# Patient Record
Sex: FEMALE | Race: BLACK OR AFRICAN AMERICAN | Employment: OTHER | URBAN - METROPOLITAN AREA
[De-identification: names, ages, dates, MRNs, and addresses within clinical notes are randomized per-mention and may not be internally consistent; named-entity substitution may affect disease eponyms.]

---

## 2017-02-20 ENCOUNTER — TELEPHONE (OUTPATIENT)
Dept: FAMILY MEDICINE CLINIC | Age: 62
End: 2017-02-20

## 2017-02-20 NOTE — TELEPHONE ENCOUNTER
Patient just called stating she should not have to make any appointment to talk to her doctor.       (Documentation made)
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
## 2017-02-20 NOTE — TELEPHONE ENCOUNTER
Patient calling to office and asking that Dr. Paige Casanova please call her. Patient states she doesn't understand why she can't just call to office and have someone available to speak with her. Patient notes she called to office earlier and was placed on hold to speak with Rhianna MILLER( Practice Admin)and after holding was told that Tomas Briones didn't want to speak with her. Patient states we should have our calls recorded and ask that tape be pulled. Patient also notes that she received a call from nurse at office today and claims nurse was rude to her, after the nurse simply told her to schedule appointment to come in to discuss her issues. Explained to patient that she can't expect to call into office and have someone always available to speak to her during clinic with exception of urgent calls. Patient stated she understood that and that she's not dumb. I told patient I didn't say you were dumb and would never say such. Patient kept repeatedly saying how she didn't understand why she cant just call in and speak to someone and or they call her back. Patient was very negative about the practice and states the customer service here was awful. Patient ask for a number to call to express her concerns. I provided her with the number to the patient advocacy [phone]. Patient also stated she didn't want to have to come to office and act like a ( \" ni **a \"). Patient asked for my name at end of call and for the spelling.   Sharon (PSR)

## 2017-02-20 NOTE — TELEPHONE ENCOUNTER
Spoke to patient who states that she has something with her breast that she is worried about it. I asked her if it had heat it she stated no. There is no drainage per the patient. I urged her to have it evaluated and I scheduled an appointment with Dr. Sharron Mccurdy. Ms. Aaron Garza expectations vs the workflow here was discussed. She was also reminded again of making appointments should she need to speak to the physician.

## 2017-02-27 ENCOUNTER — OFFICE VISIT (OUTPATIENT)
Dept: FAMILY MEDICINE CLINIC | Age: 62
End: 2017-02-27

## 2017-02-27 VITALS
BODY MASS INDEX: 23.55 KG/M2 | HEART RATE: 84 BPM | SYSTOLIC BLOOD PRESSURE: 164 MMHG | OXYGEN SATURATION: 94 % | TEMPERATURE: 97.8 F | WEIGHT: 159 LBS | RESPIRATION RATE: 18 BRPM | HEIGHT: 69 IN | DIASTOLIC BLOOD PRESSURE: 99 MMHG

## 2017-02-27 DIAGNOSIS — R17 ELEVATED BILIRUBIN: ICD-10-CM

## 2017-02-27 DIAGNOSIS — R73.03 PREDIABETES: ICD-10-CM

## 2017-02-27 DIAGNOSIS — I10 ESSENTIAL HYPERTENSION: ICD-10-CM

## 2017-02-27 DIAGNOSIS — R17 SCLERAL ICTERUS: ICD-10-CM

## 2017-02-27 DIAGNOSIS — Z91.14 NON COMPLIANCE W MEDICATION REGIMEN: ICD-10-CM

## 2017-02-27 DIAGNOSIS — N64.4 MASTODYNIA OF LEFT BREAST: Primary | ICD-10-CM

## 2017-02-27 NOTE — PROGRESS NOTES
Chief Complaint   Patient presents with    Breast pain     left breast heavy feeling. .. right leg swell and feels heavy as well. . wants some labwork. ..      1. Have you been to the ER, urgent care clinic since your last visit? Hospitalized since your last visit? No    2. Have you seen or consulted any other health care providers outside of the [address] [place] [address] since your last visit? Include any pap smears or colon screening.  No

## 2017-02-27 NOTE — PATIENT INSTRUCTIONS
Breast Pain: Care Instructions  Your Care Instructions  Breast tenderness and pain may come and go with your monthly periods (cyclic), or it may not follow any pattern (noncyclic). Breast pain is rarely caused by a serious health problem. You may need tests to find the cause. Follow-up care is a key part of your treatment and safety. Be sure to make and go to all appointments, and call your doctor if you are having problems. Its also a good idea to know your test results and keep a list of the medicines you take. How can you care for yourself at home? · If your doctor gave you medicine, take it exactly as prescribed. Call your doctor if you think you are having a problem with your medicine. · Take an over-the-counter pain medicine, such as acetaminophen (Tylenol), ibuprofen (Advil, Motrin), or naproxen (Aleve), to relieve pain and swelling. Read and follow all instructions on the label. · Do not take two or more pain medicines at the same time unless the doctor told you to. Many pain medicines have acetaminophen, which is Tylenol. Too much acetaminophen (Tylenol) can be harmful. · Wear a supportive bra, such as a sports bra or a jog bra. · Cut down on the amount of fat in your diet. If you need help planning healthy meals, see a dietitian. · Get at least 30 minutes of exercise on most days of the week. Walking is a good choice. You also may want to do other activities, such as running, swimming, cycling, or playing tennis or team sports. · Keep a healthy sleep pattern. Go to bed at the same time every night, and get up at the same time every day. When should you call for help? Call your doctor now or seek immediate medical care if:  · You have new changes in a breast, such as:  ¨ A lump or thickening in your breast or armpit. ¨ A change in the breast's size or shape. ¨ Skin changes, such as dimples or puckers. ¨ Nipple discharge.   ¨ A change in the color or feel of the skin of your breast or the darker area around the nipple (areola). ¨ A change in the shape of the nipple (it may look like it's being pulled into the breast). · You have symptoms of a breast infection, such as:  ¨ Increased pain, swelling, redness, or warmth around a breast.  ¨ Red streaks extending from the breast.  ¨ Pus draining from a breast.  ¨ A fever. Watch closely for changes in your health, and be sure to contact your doctor if:  · Your breast pain does not get better after 1 week. · You have a lump or thickening in your breast or armpit. Where can you learn more? Go to http://karen-alfredito.info/. Enter O515 in the search box to learn more about \"Breast Pain: Care Instructions. \"  Current as of: May 27, 2016  Content Version: 11.1  © 0600-6612 [employer]. Care instructions adapted under license by [employer] (which disclaims liability or warranty for this information). If you have questions about a medical condition or this instruction, always ask your healthcare professional. [place] [address] any warranty or liability for your use of this information. Prediabetes: Care Instructions  Your Care Instructions  Prediabetes is a warning sign that you are at risk for getting type 2 diabetes. It means that your blood sugar is higher than it should be. The food you eat turns into sugar, which your body uses for energy. Normally, an organ called the pancreas makes insulin, which allows the sugar in your blood to get into your body's cells. But when your body can't use insulin the right way, the sugar doesn't move into cells. It stays in your blood instead. This is called insulin resistance. The buildup of sugar in the blood causes prediabetes. The good news is that lifestyle changes may help you get your blood sugar back to normal and help you avoid or delay diabetes. Follow-up care is a key part of your treatment and safety.  Be sure to make and go to all appointments, and call your doctor if you are having problems. It's also a good idea to know your test results and keep a list of the medicines you take. How can you care for yourself at home? · Watch your weight. A healthy weight helps your body use insulin properly. · Limit the amount of calories, sweets, and unhealthy fat you eat. Ask your doctor if you should see a dietitian. A registered dietitian can help you create meal plans that fit your lifestyle. · Get at least 30 minutes of exercise on most days of the week. Exercise helps control your blood sugar. It also helps you maintain a healthy weight. Walking is a good choice. You also may want to do other activities, such as running, swimming, cycling, or playing tennis or team sports. · Do not smoke. Smoking can make prediabetes worse. If you need help quitting, talk to your doctor about stop-smoking programs and medicines. These can increase your chances of quitting for good. · If your doctor prescribed medicines, take them exactly as prescribed. Call your doctor if you think you are having a problem with your medicine. You will get more details on the specific medicines your doctor prescribes. When should you call for help? Watch closely for changes in your health, and be sure to contact your doctor if:  · You have any symptoms of diabetes. These may include:  ¨ Being thirsty more often. ¨ Urinating more. ¨ Being hungrier. ¨ Losing weight. ¨ Being very tired. ¨ Having blurry vision. · You have a wound that will not heal.  · You have an infection that will not go away. · You have problems with your blood pressure. · You want more information about diabetes and how you can keep from getting it. Where can you learn more? Go to http://karen-alfredito.info/. Enter I222 in the search box to learn more about \"Prediabetes: Care Instructions. \"  Current as of: May 23, 2016  Content Version: 11.1  © 3843-1182 [employer]. Care instructions adapted under license by [employer] (which disclaims liability or warranty for this information). If you have questions about a medical condition or this instruction, always ask your healthcare professional. [address] any warranty or liability for your use of this information. High Blood Pressure: Care Instructions  Your Care Instructions  If your blood pressure is usually above 140/90, you have high blood pressure, or hypertension. That means the top number is 140 or higher or the bottom number is 90 or higher, or both. Despite what a lot of people think, high blood pressure usually doesn't cause headaches or make you feel dizzy or lightheaded. It usually has no symptoms. But it does increase your risk for heart attack, stroke, and kidney or eye damage. The higher your blood pressure, the more your risk increases. Your doctor will give you a goal for your blood pressure. Your goal will be based on your health and your age. An example of a goal is to keep your blood pressure below 140/90. Lifestyle changes, such as eating healthy and being active, are always important to help lower blood pressure. You might also take medicine to reach your blood pressure goal.  Follow-up care is a key part of your treatment and safety. Be sure to make and go to all appointments, and call your doctor if you are having problems. It's also a good idea to know your test results and keep a list of the medicines you take. How can you care for yourself at home? Medical treatment  · If you stop taking your medicine, your blood pressure will go back up. You may take one or more types of medicine to lower your blood pressure. Be safe with medicines. Take your medicine exactly as prescribed. Call your doctor if you think you are having a problem with your medicine. · Talk to your doctor before you start taking aspirin every day.  Aspirin can help certain people lower their risk of a heart attack or stroke. But taking aspirin isn't right for everyone, because it can cause serious bleeding. · See your doctor regularly. You may need to see the doctor more often at first or until your blood pressure comes down. · If you are taking blood pressure medicine, talk to your doctor before you take decongestants or anti-inflammatory medicine, such as ibuprofen. Some of these medicines can raise blood pressure. · Learn how to check your blood pressure at home. Lifestyle changes  · Stay at a healthy weight. This is especially important if you put on weight around the waist. Losing even 10 pounds can help you lower your blood pressure. · If your doctor recommends it, get more exercise. Walking is a good choice. Bit by bit, increase the amount you walk every day. Try for at least 30 minutes on most days of the week. You also may want to swim, bike, or do other activities. · Avoid or limit alcohol. Talk to your doctor about whether you can drink any alcohol. · Try to limit how much sodium you eat to less than 2,300 milligrams (mg) a day. Your doctor may ask you to try to eat less than 1,500 mg a day. · Eat plenty of fruits (such as bananas and oranges), vegetables, legumes, whole grains, and low-fat dairy products. · Lower the amount of saturated fat in your diet. Saturated fat is found in animal products such as milk, cheese, and meat. Limiting these foods may help you lose weight and also lower your risk for heart disease. · Do not smoke. Smoking increases your risk for heart attack and stroke. If you need help quitting, talk to your doctor about stop-smoking programs and medicines. These can increase your chances of quitting for good. When should you call for help? Call 911 anytime you think you may need emergency care. This may mean having symptoms that suggest that your blood pressure is causing a serious heart or blood vessel problem. Your blood pressure may be over 180/110.   For example, call 911 if:  · You have symptoms of a heart attack. These may include:  ¨ Chest pain or pressure, or a strange feeling in the chest.  ¨ Sweating. ¨ Shortness of breath. ¨ Nausea or vomiting. ¨ Pain, pressure, or a strange feeling in the back, neck, jaw, or upper belly or in one or both shoulders or arms. ¨ Lightheadedness or sudden weakness. ¨ A fast or irregular heartbeat. · You have symptoms of a stroke. These may include:  ¨ Sudden numbness, tingling, weakness, or loss of movement in your face, arm, or leg, especially on only one side of your body. ¨ Sudden vision changes. ¨ Sudden trouble speaking. ¨ Sudden confusion or trouble understanding simple statements. ¨ Sudden problems with walking or balance. ¨ A sudden, severe headache that is different from past headaches. · You have severe back or belly pain. Do not wait until your blood pressure comes down on its own. Get help right away. Call your doctor now or seek immediate care if:  · Your blood pressure is much higher than normal (such as 180/110 or higher), but you don't have symptoms. · You think high blood pressure is causing symptoms, such as:  ¨ Severe headache. ¨ Blurry vision. Watch closely for changes in your health, and be sure to contact your doctor if:  · Your blood pressure measures 140/90 or higher at least 2 times. That means the top number is 140 or higher or the bottom number is 90 or higher, or both. · You think you may be having side effects from your blood pressure medicine. · Your blood pressure is usually normal, but it goes above normal at least 2 times. Where can you learn more? Go to http://karen-alfredito.info/. Enter W877 in the search box to learn more about \"High Blood Pressure: Care Instructions. \"  Current as of: August 8, 2016  Content Version: 11.1  © 3785-0369 [facility].  Care instructions adapted under license by "[employer] (which disclaims liability or warranty for this information). If you have questions about a medical condition or this instruction, always ask your healthcare professional. [address] any warranty or liability for your use of this information. DASH Diet: Care Instructions  Your Care Instructions  The DASH diet is an eating plan that can help lower your blood pressure. DASH stands for Dietary Approaches to Stop Hypertension. Hypertension is high blood pressure. The DASH diet focuses on eating foods that are high in calcium, potassium, and magnesium. These nutrients can lower blood pressure. The foods that are highest in these nutrients are fruits, vegetables, low-fat dairy products, nuts, seeds, and legumes. But taking calcium, potassium, and magnesium supplements instead of eating foods that are high in those nutrients does not have the same effect. The DASH diet also includes whole grains, fish, and poultry. The DASH diet is one of several lifestyle changes your doctor may recommend to lower your high blood pressure. Your doctor may also want you to decrease the amount of sodium in your diet. Lowering sodium while following the DASH diet can lower blood pressure even further than just the DASH diet alone. Follow-up care is a key part of your treatment and safety. Be sure to make and go to all appointments, and call your doctor if you are having problems. It's also a good idea to know your test results and keep a list of the medicines you take. How can you care for yourself at home? Following the DASH diet  · Eat 4 to 5 servings of fruit each day. A serving is 1 medium-sized piece of fruit, ½ cup chopped or canned fruit, 1/4 cup dried fruit, or 4 ounces (½ cup) of fruit juice. Choose fruit more often than fruit juice. · Eat 4 to 5 servings of vegetables each day. A serving is 1 cup of lettuce or raw leafy vegetables, ½ cup of chopped or cooked vegetables, or 4 ounces (½ cup) of vegetable juice.  Choose vegetables more often than vegetable juice. · Get 2 to 3 servings of low-fat and fat-free dairy each day. A serving is 8 ounces of milk, 1 cup of yogurt, or 1 ½ ounces of cheese. · Eat 6 to 8 servings of grains each day. A serving is 1 slice of bread, 1 ounce of dry cereal, or ½ cup of cooked rice, pasta, or cooked cereal. Try to choose whole-grain products as much as possible. · Limit lean meat, poultry, and fish to 2 servings each day. A serving is 3 ounces, about the size of a deck of cards. · Eat 4 to 5 servings of nuts, seeds, and legumes (cooked dried beans, lentils, and split peas) each week. A serving is 1/3 cup of nuts, 2 tablespoons of seeds, or ½ cup of cooked beans or peas. · Limit fats and oils to 2 to 3 servings each day. A serving is 1 teaspoon of vegetable oil or 2 tablespoons of salad dressing. · Limit sweets and added sugars to 5 servings or less a week. A serving is 1 tablespoon jelly or jam, ½ cup sorbet, or 1 cup of lemonade. · Eat less than 2,300 milligrams (mg) of sodium a day. If you limit your sodium to 1,500 mg a day, you can lower your blood pressure even more. Tips for success  · Start small. Do not try to make dramatic changes to your diet all at once. You might feel that you are missing out on your favorite foods and then be more likely to not follow the plan. Make small changes, and stick with them. Once those changes become habit, add a few more changes. · Try some of the following:  ¨ Make it a goal to eat a fruit or vegetable at every meal and at snacks. This will make it easy to get the recommended amount of fruits and vegetables each day. ¨ Try yogurt topped with fruit and nuts for a snack or healthy dessert. ¨ Add lettuce, tomato, cucumber, and onion to sandwiches. ¨ Combine a ready-made pizza crust with low-fat mozzarella cheese and lots of vegetable toppings. Try using tomatoes, squash, spinach, broccoli, carrots, cauliflower, and onions.   ¨ Have a variety of cut-up vegetables with a low-fat dip as an appetizer instead of chips and dip. ¨ Sprinkle sunflower seeds or chopped almonds over salads. Or try adding chopped walnuts or almonds to cooked vegetables. ¨ Try some vegetarian meals using beans and peas. Add garbanzo or kidney beans to salads. Make burritos and tacos with mashed loza beans or black beans. Where can you learn more? Go to http://karen-alfredito.info/. Enter L797 in the search box to learn more about \"DASH Diet: Care Instructions. \"  Current as of: March 23, 2016  Content Version: 11.1  © 8403-3449 [employer]. Care instructions adapted under license by [employer] (which disclaims liability or warranty for this information). If you have questions about a medical condition or this instruction, always ask your healthcare professional. [address] any warranty or liability for your use of this information.

## 2017-02-27 NOTE — MR AVS SNAPSHOT
Visit Information Date & Time Provider Department Dept. Phone Encounter #  
 2/27/2017 10:30 AM Lyndsey Pfeiffer, [address] [facility] [phone] [id number] Follow-up Instructions Return if symptoms worsen or fail to improve. Follow-up and Disposition History Upcoming Health Maintenance Date Due INFLUENZA AGE 9 TO ADULT 8/1/2017 FOBT Q 1 YEAR AGE 50-75 9/28/2017 BREAST CANCER SCRN MAMMOGRAM 10/15/2017 PAP AKA CERVICAL CYTOLOGY 9/28/2019 DTaP/Tdap/Td series (2 - Td) 9/28/2026 Allergies as of 2/27/2017  Review Complete On: 2/27/2017 By: Lyndsey Pfeiffer MD  
 No Known Allergies Current Immunizations  Reviewed on 10/2/2013 No immunizations on file. Not reviewed this visit You Were Diagnosed With   
  
 Codes Comments Mastodynia of left breast    -  Primary ICD-10-CM: N64.4 ICD-9-CM: 611.71 Non compliance w medication regimen     ICD-10-CM: Z91.14 
ICD-9-CM: V15.81 Essential hypertension     ICD-10-CM: I10 
ICD-9-CM: 401.9 Prediabetes     ICD-10-CM: R73.03 
ICD-9-CM: 790.29 Scleral icterus     ICD-10-CM: R17 
ICD-9-CM: 782.4 Elevated bilirubin     ICD-10-CM: R17 
ICD-9-CM: 277.4 Vitals BP Pulse Temp Resp Height(growth percentile) Weight(growth percentile) (!) 164/99 84 97.8 °F (36.6 °C) (Oral) 18 5' 9\" (1.753 m) 159 lb (72.1 kg) SpO2 BMI OB Status Smoking Status 94% 23.48 kg/m2 Hysterectomy Current Every Day Smoker BMI and BSA Data Body Mass Index Body Surface Area  
 23.48 kg/m 2 1.87 m 2 Preferred Pharmacy Pharmacy Name Phone [facility] [address]  [facility] [phone] Your Updated Medication List  
  
   
This list is accurate as of: 2/27/17 11:59 PM.  Always use your most recent med list.  
  
  
  
  
 DAILY MULTI-VITAMINS/IRON tablet Generic drug:  multivitamin with iron Take 1 Tab by mouth daily. ergocalciferol 50,000 unit capsule Commonly known as:  VITAMIN D2 Take 1 Cap by mouth every seven (7) days. levothyroxine 150 mcg tablet Commonly known as:  SYNTHROID  
TAKE ONE TABLET BY MOUTH DAILY BEFORE BREAKFAST  
  
 lisinopril 5 mg tablet Commonly known as:  Briseyda Coronado We Performed the Following BILIRUBIN, FRACTIONATED X5122505 CPT(R)] HEMOGLOBIN A1C WITH EAG [46256 CPT(R)] METABOLIC PANEL, COMPREHENSIVE [76188 CPT(R)] Follow-up Instructions Return if symptoms worsen or fail to improve. To-Do List   
 02/27/2017 Imaging:  THIERRY 3D TADEO W MAMMO BI DX INCL CAD Patient Instructions Breast Pain: Care Instructions Your Care Instructions Breast tenderness and pain may come and go with your monthly periods (cyclic), or it may not follow any pattern (noncyclic). Breast pain is rarely caused by a serious health problem. You may need tests to find the cause. Follow-up care is a key part of your treatment and safety. Be sure to make and go to all appointments, and call your doctor if you are having problems. Its also a good idea to know your test results and keep a list of the medicines you take. How can you care for yourself at home? · If your doctor gave you medicine, take it exactly as prescribed. Call your doctor if you think you are having a problem with your medicine. · Take an over-the-counter pain medicine, such as acetaminophen (Tylenol), ibuprofen (Advil, Motrin), or naproxen (Aleve), to relieve pain and swelling. Read and follow all instructions on the label. · Do not take two or more pain medicines at the same time unless the doctor told you to. Many pain medicines have acetaminophen, which is Tylenol. Too much acetaminophen (Tylenol) can be harmful. · Wear a supportive bra, such as a sports bra or a jog bra. · Cut down on the amount of fat in your diet. If you need help planning healthy meals, see a dietitian. · Get at least 30 minutes of exercise on most days of the week. Walking is a good choice. You also may want to do other activities, such as running, swimming, cycling, or playing tennis or team sports. · Keep a healthy sleep pattern. Go to bed at the same time every night, and get up at the same time every day. When should you call for help? Call your doctor now or seek immediate medical care if: 
· You have new changes in a breast, such as: ¨ A lump or thickening in your breast or armpit. ¨ A change in the breast's size or shape. ¨ Skin changes, such as dimples or puckers. ¨ Nipple discharge. ¨ A change in the color or feel of the skin of your breast or the darker area around the nipple (areola). ¨ A change in the shape of the nipple (it may look like it's being pulled into the breast). · You have symptoms of a breast infection, such as: 
¨ Increased pain, swelling, redness, or warmth around a breast. 
¨ Red streaks extending from the breast. 
¨ Pus draining from a breast. 
¨ A fever. Watch closely for changes in your health, and be sure to contact your doctor if: 
· Your breast pain does not get better after 1 week. · You have a lump or thickening in your breast or armpit. Where can you learn more? Go to http://karen-alfredito.info/. Enter S016 in the search box to learn more about \"Breast Pain: Care Instructions. \" Current as of: May 27, 2016 Content Version: 11.1 © 6001-0622 [employer]. Care instructions adapted under license by GitHub (which disclaims liability or warranty for this information). If you have questions about a medical condition or this instruction, always ask your healthcare professional. [address] any warranty or liability for your use of this information. Prediabetes: Care Instructions Your Care Instructions Prediabetes is a warning sign that you are at risk for getting type 2 diabetes. It means that your blood sugar is higher than it should be. The food you eat turns into sugar, which your body uses for energy. Normally, an organ called the pancreas makes insulin, which allows the sugar in your blood to get into your body's cells. But when your body can't use insulin the right way, the sugar doesn't move into cells. It stays in your blood instead. This is called insulin resistance. The buildup of sugar in the blood causes prediabetes. The good news is that lifestyle changes may help you get your blood sugar back to normal and help you avoid or delay diabetes. Follow-up care is a key part of your treatment and safety. Be sure to make and go to all appointments, and call your doctor if you are having problems. It's also a good idea to know your test results and keep a list of the medicines you take. How can you care for yourself at home? · Watch your weight. A healthy weight helps your body use insulin properly. · Limit the amount of calories, sweets, and unhealthy fat you eat. Ask your doctor if you should see a dietitian. A registered dietitian can help you create meal plans that fit your lifestyle. · Get at least 30 minutes of exercise on most days of the week. Exercise helps control your blood sugar. It also helps you maintain a healthy weight. Walking is a good choice. You also may want to do other activities, such as running, swimming, cycling, or playing tennis or team sports. · Do not smoke. Smoking can make prediabetes worse. If you need help quitting, talk to your doctor about stop-smoking programs and medicines. These can increase your chances of quitting for good. · If your doctor prescribed medicines, take them exactly as prescribed. Call your doctor if you think you are having a problem with your medicine.  You will get more details on the specific medicines your doctor prescribes. When should you call for help? Watch closely for changes in your health, and be sure to contact your doctor if: 
· You have any symptoms of diabetes. These may include: ¨ Being thirsty more often. ¨ Urinating more. ¨ Being hungrier. ¨ Losing weight. ¨ Being very tired. ¨ Having blurry vision. · You have a wound that will not heal. 
· You have an infection that will not go away. · You have problems with your blood pressure. · You want more information about diabetes and how you can keep from getting it. Where can you learn more? Go to http://karen-alfredito.info/. Enter I222 in the search box to learn more about \"Prediabetes: Care Instructions. \" Current as of: May 23, 2016 Content Version: 11.1 © 3644-9618 [employer] Care instructions adapted under license by [email or web address] (which disclaims liability or warranty for this information). If you have questions about a medical condition or this instruction, always ask your healthcare professional. [place] [address] any warranty or liability for your use of this information. High Blood Pressure: Care Instructions Your Care Instructions If your blood pressure is usually above 140/90, you have high blood pressure, or hypertension. That means the top number is 140 or higher or the bottom number is 90 or higher, or both. Despite what a lot of people think, high blood pressure usually doesn't cause headaches or make you feel dizzy or lightheaded. It usually has no symptoms. But it does increase your risk for heart attack, stroke, and kidney or eye damage. The higher your blood pressure, the more your risk increases. Your doctor will give you a goal for your blood pressure. Your goal will be based on your health and your age. An example of a goal is to keep your blood pressure below 140/90.  
Lifestyle changes, such as eating healthy and being active, are always important to help lower blood pressure. You might also take medicine to reach your blood pressure goal. 
Follow-up care is a key part of your treatment and safety. Be sure to make and go to all appointments, and call your doctor if you are having problems. It's also a good idea to know your test results and keep a list of the medicines you take. How can you care for yourself at home? Medical treatment · If you stop taking your medicine, your blood pressure will go back up. You may take one or more types of medicine to lower your blood pressure. Be safe with medicines. Take your medicine exactly as prescribed. Call your doctor if you think you are having a problem with your medicine. · Talk to your doctor before you start taking aspirin every day. Aspirin can help certain people lower their risk of a heart attack or stroke. But taking aspirin isn't right for everyone, because it can cause serious bleeding. · See your doctor regularly. You may need to see the doctor more often at first or until your blood pressure comes down. · If you are taking blood pressure medicine, talk to your doctor before you take decongestants or anti-inflammatory medicine, such as ibuprofen. Some of these medicines can raise blood pressure. · Learn how to check your blood pressure at home. Lifestyle changes · Stay at a healthy weight. This is especially important if you put on weight around the waist. Losing even 10 pounds can help you lower your blood pressure. · If your doctor recommends it, get more exercise. Walking is a good choice. Bit by bit, increase the amount you walk every day. Try for at least 30 minutes on most days of the week. You also may want to swim, bike, or do other activities. · Avoid or limit alcohol. Talk to your doctor about whether you can drink any alcohol. · Try to limit how much sodium you eat to less than 2,300 milligrams (mg) a day. Your doctor may ask you to try to eat less than 1,500 mg a day. · Eat plenty of fruits (such as bananas and oranges), vegetables, legumes, whole grains, and low-fat dairy products. · Lower the amount of saturated fat in your diet. Saturated fat is found in animal products such as milk, cheese, and meat. Limiting these foods may help you lose weight and also lower your risk for heart disease. · Do not smoke. Smoking increases your risk for heart attack and stroke. If you need help quitting, talk to your doctor about stop-smoking programs and medicines. These can increase your chances of quitting for good. When should you call for help? Call 911 anytime you think you may need emergency care. This may mean having symptoms that suggest that your blood pressure is causing a serious heart or blood vessel problem. Your blood pressure may be over 180/110. For example, call 911 if: 
· You have symptoms of a heart attack. These may include: ¨ Chest pain or pressure, or a strange feeling in the chest. 
¨ Sweating. ¨ Shortness of breath. ¨ Nausea or vomiting. ¨ Pain, pressure, or a strange feeling in the back, neck, jaw, or upper belly or in one or both shoulders or arms. ¨ Lightheadedness or sudden weakness. ¨ A fast or irregular heartbeat. · You have symptoms of a stroke. These may include: 
¨ Sudden numbness, tingling, weakness, or loss of movement in your face, arm, or leg, especially on only one side of your body. ¨ Sudden vision changes. ¨ Sudden trouble speaking. ¨ Sudden confusion or trouble understanding simple statements. ¨ Sudden problems with walking or balance. ¨ A sudden, severe headache that is different from past headaches. · You have severe back or belly pain. Do not wait until your blood pressure comes down on its own. Get help right away. Call your doctor now or seek immediate care if: 
· Your blood pressure is much higher than normal (such as 180/110 or higher), but you don't have symptoms. · You think high blood pressure is causing symptoms, such as: ¨ Severe headache. ¨ Blurry vision. Watch closely for changes in your health, and be sure to contact your doctor if: 
· Your blood pressure measures 140/90 or higher at least 2 times. That means the top number is 140 or higher or the bottom number is 90 or higher, or both. · You think you may be having side effects from your blood pressure medicine. · Your blood pressure is usually normal, but it goes above normal at least 2 times. Where can you learn more? Go to http://karen-alfredito.info/. Enter H398 in the search box to learn more about \"High Blood Pressure: Care Instructions. \" Current as of: August 8, 2016 Content Version: 11.1 © 7582-9839 [employer]. Care instructions adapted under license by [employer] (which disclaims liability or warranty for this information). If you have questions about a medical condition or this instruction, always ask your healthcare professional. [place] [address] any warranty or liability for your use of this information. DASH Diet: Care Instructions Your Care Instructions The DASH diet is an eating plan that can help lower your blood pressure. DASH stands for Dietary Approaches to Stop Hypertension. Hypertension is high blood pressure. The DASH diet focuses on eating foods that are high in calcium, potassium, and magnesium. These nutrients can lower blood pressure. The foods that are highest in these nutrients are fruits, vegetables, low-fat dairy products, nuts, seeds, and legumes. But taking calcium, potassium, and magnesium supplements instead of eating foods that are high in those nutrients does not have the same effect. The DASH diet also includes whole grains, fish, and poultry. The DASH diet is one of several lifestyle changes your doctor may recommend to lower your high blood pressure.  Your doctor may also want you to decrease the amount of sodium in your diet. Lowering sodium while following the DASH diet can lower blood pressure even further than just the DASH diet alone. Follow-up care is a key part of your treatment and safety. Be sure to make and go to all appointments, and call your doctor if you are having problems. It's also a good idea to know your test results and keep a list of the medicines you take. How can you care for yourself at home? Following the DASH diet · Eat 4 to 5 servings of fruit each day. A serving is 1 medium-sized piece of fruit, ½ cup chopped or canned fruit, 1/4 cup dried fruit, or 4 ounces (½ cup) of fruit juice. Choose fruit more often than fruit juice. · Eat 4 to 5 servings of vegetables each day. A serving is 1 cup of lettuce or raw leafy vegetables, ½ cup of chopped or cooked vegetables, or 4 ounces (½ cup) of vegetable juice. Choose vegetables more often than vegetable juice. · Get 2 to 3 servings of low-fat and fat-free dairy each day. A serving is 8 ounces of milk, 1 cup of yogurt, or 1 ½ ounces of cheese. · Eat 6 to 8 servings of grains each day. A serving is 1 slice of bread, 1 ounce of dry cereal, or ½ cup of cooked rice, pasta, or cooked cereal. Try to choose whole-grain products as much as possible. · Limit lean meat, poultry, and fish to 2 servings each day. A serving is 3 ounces, about the size of a deck of cards. · Eat 4 to 5 servings of nuts, seeds, and legumes (cooked dried beans, lentils, and split peas) each week. A serving is 1/3 cup of nuts, 2 tablespoons of seeds, or ½ cup of cooked beans or peas. · Limit fats and oils to 2 to 3 servings each day. A serving is 1 teaspoon of vegetable oil or 2 tablespoons of salad dressing. · Limit sweets and added sugars to 5 servings or less a week. A serving is 1 tablespoon jelly or jam, ½ cup sorbet, or 1 cup of lemonade. · Eat less than 2,300 milligrams (mg) of sodium a day.  If you limit your sodium to 1,500 mg a day, you can lower your blood pressure even more. Tips for success · Start small. Do not try to make dramatic changes to your diet all at once. You might feel that you are missing out on your favorite foods and then be more likely to not follow the plan. Make small changes, and stick with them. Once those changes become habit, add a few more changes. · Try some of the following: ¨ Make it a goal to eat a fruit or vegetable at every meal and at snacks. This will make it easy to get the recommended amount of fruits and vegetables each day. ¨ Try yogurt topped with fruit and nuts for a snack or healthy dessert. ¨ Add lettuce, tomato, cucumber, and onion to sandwiches. ¨ Combine a ready-made pizza crust with low-fat mozzarella cheese and lots of vegetable toppings. Try using tomatoes, squash, spinach, broccoli, carrots, cauliflower, and onions. ¨ Have a variety of cut-up vegetables with a low-fat dip as an appetizer instead of chips and dip. ¨ Sprinkle sunflower seeds or chopped almonds over salads. Or try adding chopped walnuts or almonds to cooked vegetables. ¨ Try some vegetarian meals using beans and peas. Add garbanzo or kidney beans to salads. Make burritos and tacos with mashed loza beans or black beans. Where can you learn more? Go to http://karen-alfredito.info/. Enter X229 in the search box to learn more about \"DASH Diet: Care Instructions. \" Current as of: March 23, 2016 Content Version: 11.1 © 2030-3599 [employer]. Care instructions adapted under license by [employer] (which disclaims liability or warranty for this information). If you have questions about a medical condition or this instruction, always ask your healthcare professional. [address] any warranty or liability for your use of this information. Introducing [facility]! Jr Pringle introduces [employer] patient portal. Now you can access parts of your medical record, email your doctor's office, and request medication refills online. 1. In your internet browser, go to [email or web address]://[employer]. [employer]You/[employer] 2. Click on the First Time User? Click Here link in the Sign In box. You will see the New Member Sign Up page. 3. Enter your [employer] Access Code exactly as it appears below. You will not need to use this code after youve completed the sign-up process. If you do not sign up before the expiration date, you must request a new code. · [employer] Access Code: [email or web address] Expires: 5/28/2017 12:21 PM 
 
4. Enter the last four digits of your Social Security Number (xxxx) and Date of Birth (mm/dd/yyyy) as indicated and click Submit. You will be taken to the next sign-up page. 5. Create a [employer] ID. This will be your [employer] login ID and cannot be changed, so think of one that is secure and easy to remember. 6. Create a [employer] password. You can change your password at any time. 7. Enter your Password Reset Question and Answer. This can be used at a later time if you forget your password. 8. Enter your e-mail address. You will receive e-mail notification when new information is available in [address] 9. Click Sign Up. You can now view and download portions of your medical record. 10. Click the Download Summary menu link to download a portable copy of your medical information. If you have questions, please visit the Frequently Asked Questions section of the [employer] website. Remember, [employer] is NOT to be used for urgent needs. For medical emergencies, dial 911. Now available from your iPhone and Android! Please provide this summary of care documentation to your next provider. Your primary care clinician is listed as Angelica Staton. If you have any questions after today's visit, please call [phone].

## 2017-02-27 NOTE — PROGRESS NOTES
59 yo AAF with  has a past medical history of Anxiety; Arthritis; Depression; Dizziness; Falls; HTN (hypertension) (6/25/2010); Hypothyroid (6/25/2010); Thyroid disease; and Wrist fracture, left. Presenting for   1. Hypertension. She is not taking her medication regularly and recently skipped doses. 2. Prediabetic. She does note some leg swelling which improves in the morning. Worsens throughout the day. Associated with paresthesias in the right foot. 3. Breast left heaviness. Associated with some breast pain. Associated with arm, lateral, pain. 8/10 pain scale. Described: stabbing and achy. Aggravating or alleviating factors: better with support bra. Last mammogram 2015. Order placed and due this year. Denies any discharge. Past Medical History:   Diagnosis Date    Anxiety     Arthritis     Depression     Dizziness     Falls     HTN (hypertension) 6/25/2010    Hypothyroid 6/25/2010    Thyroid disease     Wrist fracture, left      Past Surgical History:   Procedure Laterality Date    HX GYN      HX HYSTERECTOMY       Family History   Problem Relation Age of Onset    Diabetes Mother      Social History     Social History    Marital status:      Spouse name: N/A    Number of children: N/A    Years of education: N/A     Occupational History    Not on file.      Social History Main Topics    Smoking status: Current Every Day Smoker     Packs/day: 1.00     Years: 35.00     Types: Cigarettes    Smokeless tobacco: Never Used    Alcohol use 12.0 oz/week     24 Cans of beer per week    Drug use: Yes     Special: Marijuana    Sexual activity: Not Currently     Other Topics Concern    Not on file     Social History Narrative       Current Outpatient Prescriptions:     lisinopril (PRINIVIL, ZESTRIL) 5 mg tablet, , Disp: , Rfl:     levothyroxine (SYNTHROID) 150 mcg tablet, TAKE ONE TABLET BY MOUTH DAILY BEFORE BREAKFAST, Disp: 90 Tab, Rfl: 3    multivitamin with iron (DAILY MULTI-VITAMINS/IRON) tablet, Take 1 Tab by mouth daily. , Disp: , Rfl:     ergocalciferol (VITAMIN D) 50,000 unit capsule, Take 1 Cap by mouth every seven (7) days. , Disp: 8 Cap, Rfl: 1  No Known Allergies    ROS: Pertinent ROS performed and negative except as mentioned in the HPI. Visit Vitals    BP (!) 164/99    Pulse 84    Temp 97.8 °F (36.6 °C) (Oral)    Resp 18    Ht 5' 9\" (1.753 m)    Wt 159 lb (72.1 kg)    SpO2 94%    BMI 23.48 kg/m2     PE:   General appearance - alert, well appearing, and in no distress and normal appearing weight  Neck - supple, no significant adenopathy, carotids upstroke normal bilaterally, no bruits, thyroid exam: thyroid is normal in size without nodules or tenderness  Chest - clear to auscultation, no wheezes, rales or rhonchi, symmetric air entry  Heart - normal rate, regular rhythm, normal S1, S2, no murmurs, rubs, clicks or gallops  Abdomen - soft, nontender, nondistended, no masses or organomegaly  bowel sounds normal  Breasts - breasts appear normal, no suspicious masses, no skin or nipple changes or axillary nodes, symmetric fibrous changes in both upper outer quadrants  A/P:   Rachel was seen today for breast pain. Diagnoses and all orders for this visit:    Mastodynia of left breast  -     THIERRY 3D TADEO W MAMMO BI DX INCL CAD; Future    Non compliance w medication regimen    Essential hypertension    Prediabetes  -     HEMOGLOBIN A1C WITH EAG    Scleral icterus  -     METABOLIC PANEL, COMPREHENSIVE  -     BILIRUBIN, FRACTIONATED    Elevated bilirubin  -     BILIRUBIN, FRACTIONATED    concern for scleral icterus. Check labs. Today. abd benign. I have discussed the diagnosis with the patient and the intended plan as seen in the above orders. The patient has received an after-visit summary and questions were answered concerning future plans. I have discussed medication side effects and warnings with the patient as well.  Informed pt to return to the office if symptoms worsen or if new symptoms arise.

## 2017-02-28 LAB
ALBUMIN SERPL-MCNC: 4.5 G/DL (ref 3.6–4.8)
ALBUMIN/GLOB SERPL: 1.7 {RATIO} (ref 1.1–2.5)
ALP SERPL-CCNC: 60 IU/L (ref 39–117)
ALT SERPL-CCNC: 10 IU/L (ref 0–32)
AST SERPL-CCNC: 22 IU/L (ref 0–40)
BILIRUB DIRECT SERPL-MCNC: 0.34 MG/DL (ref 0–0.4)
BILIRUB INDIRECT SERPL-MCNC: 0.76 MG/DL (ref 0.1–0.8)
BILIRUB SERPL-MCNC: 1.1 MG/DL (ref 0–1.2)
BUN SERPL-MCNC: 10 MG/DL (ref 8–27)
BUN/CREAT SERPL: 14 (ref 11–26)
CALCIUM SERPL-MCNC: 9.1 MG/DL (ref 8.7–10.3)
CHLORIDE SERPL-SCNC: 92 MMOL/L (ref 96–106)
CO2 SERPL-SCNC: 21 MMOL/L (ref 18–29)
CREAT SERPL-MCNC: 0.69 MG/DL (ref 0.57–1)
EST. AVERAGE GLUCOSE BLD GHB EST-MCNC: 123 MG/DL
GLOBULIN SER CALC-MCNC: 2.7 G/DL (ref 1.5–4.5)
GLUCOSE SERPL-MCNC: 102 MG/DL (ref 65–99)
HBA1C MFR BLD: 5.9 % (ref 4.8–5.6)
POTASSIUM SERPL-SCNC: 4 MMOL/L (ref 3.5–5.2)
PROT SERPL-MCNC: 7.2 G/DL (ref 6–8.5)
SODIUM SERPL-SCNC: 134 MMOL/L (ref 134–144)

## 2017-03-09 ENCOUNTER — TELEPHONE (OUTPATIENT)
Dept: FAMILY MEDICINE CLINIC | Age: 62
End: 2017-03-09

## 2017-03-09 DIAGNOSIS — R73.09 ABNORMAL GTT (GLUCOSE TOLERANCE TEST): Primary | ICD-10-CM

## 2017-03-09 NOTE — TELEPHONE ENCOUNTER
Patient would like Dr. Mariam Hdz to be aware that she got her labs back and she is borderline diabetic. Patient would like to discuss getting on a diet to fix things.     Please [phone]    Patient also notes that she would like to be referred to someone nearby

## 2017-03-14 ENCOUNTER — TELEPHONE (OUTPATIENT)
Dept: FAMILY MEDICINE CLINIC | Age: 62
End: 2017-03-14

## 2017-03-14 NOTE — TELEPHONE ENCOUNTER
Second attempt at calling pt regarding verifying who she would like to be referred to by physician. Left voicemail for pt to return call at earliest convenience.

## 2017-03-16 ENCOUNTER — TELEPHONE (OUTPATIENT)
Dept: DIABETES SERVICES | Age: 62
End: 2017-03-16

## 2017-03-16 NOTE — TELEPHONE ENCOUNTER
Spoke with pt to notify that a referral for diabetic education has been put in verbally per Jesus Alberto Fernandez since pt requested to be referred to be helped with diet. Pt verbalized understanding.

## 2017-05-04 ENCOUNTER — OFFICE VISIT (OUTPATIENT)
Dept: FAMILY MEDICINE CLINIC | Age: 62
End: 2017-05-04

## 2017-05-04 VITALS
RESPIRATION RATE: 20 BRPM | DIASTOLIC BLOOD PRESSURE: 85 MMHG | HEIGHT: 69 IN | SYSTOLIC BLOOD PRESSURE: 124 MMHG | BODY MASS INDEX: 23.49 KG/M2 | TEMPERATURE: 98 F | HEART RATE: 81 BPM | WEIGHT: 158.6 LBS | OXYGEN SATURATION: 99 %

## 2017-05-04 DIAGNOSIS — M54.9 BACK PAIN, UNSPECIFIED BACK LOCATION, UNSPECIFIED BACK PAIN LATERALITY, UNSPECIFIED CHRONICITY: ICD-10-CM
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
## 2017-05-04 DIAGNOSIS — I10 ESSENTIAL HYPERTENSION: ICD-10-CM

## 2017-05-04 DIAGNOSIS — F17.200 SMOKER: ICD-10-CM

## 2017-05-04 DIAGNOSIS — R73.09 ELEVATED HEMOGLOBIN A1C: ICD-10-CM

## 2017-05-04 DIAGNOSIS — E07.9 THYROID CONDITION: ICD-10-CM

## 2017-05-04 DIAGNOSIS — R26.9 GAIT ABNORMALITY: Primary | ICD-10-CM

## 2017-05-04 DIAGNOSIS — R45.89 DEPRESSED AFFECT: ICD-10-CM

## 2017-05-04 RX ORDER — DULOXETIN HYDROCHLORIDE 30 MG/1
30 CAPSULE, DELAYED RELEASE ORAL DAILY
Qty: 30 CAP | Refills: 3 | Status: SHIPPED | OUTPATIENT
Start: 2017-05-04 | End: 2017-09-05 | Stop reason: SDUPTHER

## 2017-05-04 NOTE — LETTER
5/8/2017 8:22 AM 
 
Ms. Leatha Turcios [address] [place] [address] Dear Leatha Turcios: 
 
Please find your most recent results below. Resulted Orders TSH 3RD GENERATION Result Value Ref Range TSH 0.682 0.450 - 4.500 uIU/mL Narrative Performed at:  [address], [address] [place] [address]  [id number] : Antoinette Yi MD, Phone:  [phone] CBC W/O DIFF Result Value Ref Range WBC 4.1 3.4 - 10.8 x10E3/uL  
 RBC 4.14 3.77 - 5.28 x10E6/uL HGB 13.2 11.1 - 15.9 g/dL HCT 39.8 34.0 - 46.6 % MCV 96 79 - 97 fL  
 MCH 31.9 26.6 - 33.0 pg  
 MCHC 33.2 31.5 - 35.7 g/dL  
 RDW 14.3 12.3 - 15.4 % PLATELET 518 364 - 966 x10E3/uL Narrative Performed at:  [address], [address] [place] [address]  [id number] : Antoinette Yi MD, Phone:  [phone] METABOLIC PANEL, COMPREHENSIVE Result Value Ref Range Glucose 90 65 - 99 mg/dL BUN 7 (L) 8 - 27 mg/dL Creatinine 0.65 0.57 - 1.00 mg/dL GFR est non-AA 95 >59 mL/min/1.73 GFR est  >59 mL/min/1.73  
 BUN/Creatinine ratio 11 (L) 12 - 28 Sodium 135 134 - 144 mmol/L Potassium 4.8 3.5 - 5.2 mmol/L Chloride 97 96 - 106 mmol/L  
 CO2 21 18 - 29 mmol/L Calcium 9.3 8.7 - 10.3 mg/dL Protein, total 7.3 6.0 - 8.5 g/dL Albumin 4.6 3.6 - 4.8 g/dL GLOBULIN, TOTAL 2.7 1.5 - 4.5 g/dL A-G Ratio 1.7 1.2 - 2.2 Bilirubin, total 0.4 0.0 - 1.2 mg/dL Alk. phosphatase 59 39 - 117 IU/L  
 AST (SGOT) 15 0 - 40 IU/L  
 ALT (SGPT) 8 0 - 32 IU/L Narrative Performed at:  [address]  [id number] : Antoinette Yi MD, Phone:  [phone] VITAMIN B12 Result Value Ref Range Vitamin B12 436 211 - 946 pg/mL Narrative Performed at:  [address], [address] [place] [address]  [id number] : Antoinette Yi MD, Phone:  [phone] HEMOGLOBIN A1C WITH EAG Result Value Ref Range Hemoglobin A1c 5.8 (H) 4.8 - 5.6 % Comment:  
            Pre-diabetes: 5.7 - 6.4 Diabetes: >6.4 Glycemic control for adults with diabetes: <7.0 Estimated average glucose 120 mg/dL Narrative Performed at:  [address]  [id number] : Corby Walton MD, Phone:  [phone] Your blood tests are stable. Avoid tobacco and alcohol. Your thyroid is stable on your present dose of Synthroid 150 mcg daily.  Please continue this dose and recheck in 12 months.    
 
 
Sincerely, Denver Cumins, MD

## 2017-05-04 NOTE — PATIENT INSTRUCTIONS
Because of your smoking history (greater than 30 pack years by your history), I suggest a yearly low dose CT scan as a screen for lung cancer. Obtain physical therapy    [facility] #[phone]   physical Therapy evaluate and treat:  Fall prevention, back pain    See neurologist Dr. Florentin Aguilera about your falls:  #[phone]    Please call Mariela to help arrange and authorize your tests and/or referrals.   Her # is [phone]       consider Mg Echavarria at #[phone] or [phone] after hours      National suicide prevention hotline [phone]    Stop smoking     Stop drinking alcohol    Labs today    Follow up in 2-4 weeks

## 2017-05-04 NOTE — LETTER
5/4/2017 9:29 AM 
 
Ms. Yosi Valentin [facility] [address] [facility] [[email or web address]][address] [address] I strongly suggest that you stop smoking or use of any tobacco products. This may be the most important thing you can do for your health. While medicines may help, the most important thing for you is the decision to quit. If you need a \"Quit \", please call 5-562-QUIT NOW (7-982.515.5394). If you need help with nicotine withdrawal, I suggest over-the-counter nicotine replacement aids such as nicotine gum or patches, used as directed. As you may know, use of tobacco products increases your risk for many forms of cancer, heart disease, stroke, and blood clotting. Your body is very forgiving. Quit now and improve your health! Sincerely, Aric Cohen MD
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
## 2017-05-04 NOTE — MR AVS SNAPSHOT
Visit Information Date & Time Provider Department Dept. Phone Encounter #  
 5/4/2017  9:00 AM Dejon Yeboah MD [facility] [phone] [id number] Follow-up Instructions Return in about 4 weeks (around 6/1/2017). Upcoming Health Maintenance Date Due INFLUENZA AGE 9 TO ADULT 8/1/2017 FOBT Q 1 YEAR AGE 50-75 9/28/2017 BREAST CANCER SCRN MAMMOGRAM 10/15/2017 PAP AKA CERVICAL CYTOLOGY 9/28/2019 DTaP/Tdap/Td series (2 - Td) 9/28/2026 Allergies as of 5/4/2017  Review Complete On: 5/4/2017 By: Dejon Yeboah MD  
 No Known Allergies Current Immunizations  Reviewed on 10/2/2013 No immunizations on file. Not reviewed this visit You Were Diagnosed With   
  
 Codes Comments Gait abnormality    -  Primary ICD-10-CM: R26.9 ICD-9-CM: 842. 2 Depressed affect     ICD-10-CM: R45.89 ICD-9-CM: 682 Smoker     ICD-10-CM: U71.514 ICD-9-CM: 305.1 Elevated hemoglobin A1c     ICD-10-CM: R73.09 
ICD-9-CM: 790.29 Falls, subsequent encounter     ICD-10-CM: W19. Jennifer Prudent ICD-9-CM: V58.89, E888.9 Essential hypertension     ICD-10-CM: I10 
ICD-9-CM: 401.9 Thyroid condition     ICD-10-CM: E07.9 ICD-9-CM: 246.9 Back pain, unspecified back location, unspecified back pain laterality, unspecified chronicity     ICD-10-CM: M54.9 ICD-9-CM: 724.5 Vitals BP Pulse Temp Resp Height(growth percentile) Weight(growth percentile) 124/85 (BP 1 Location: Right arm, BP Patient Position: Sitting) 81 98 °F (36.7 °C) (Oral) 20 5' 9\" (1.753 m) 158 lb 9.6 oz (71.9 kg) SpO2 BMI OB Status Smoking Status 99% 23.42 kg/m2 Hysterectomy Current Every Day Smoker Vitals History BMI and BSA Data Body Mass Index Body Surface Area  
 23.42 kg/m 2 1.87 m 2 Preferred Pharmacy Pharmacy Name Phone  [address] [place], [place] [address] AT [address] [place] [address] [address] [phone] Your Updated Medication List  
  
   
This list is accurate as of: 5/4/17  9:41 AM.  Always use your most recent med list.  
  
  
  
  
 DAILY MULTI-VITAMINS/IRON tablet Generic drug:  multivitamin with iron Take 1 Tab by mouth daily. DULoxetine 30 mg capsule Commonly known as:  CYMBALTA Take 1 Cap by mouth daily. ergocalciferol 50,000 unit capsule Commonly known as:  VITAMIN D2 Take 1 Cap by mouth every seven (7) days. levothyroxine 150 mcg tablet Commonly known as:  SYNTHROID  
TAKE ONE TABLET BY MOUTH DAILY BEFORE BREAKFAST  
  
 lisinopril 5 mg tablet Commonly known as:  Sydna Lies Prescriptions Printed Refills DULoxetine (CYMBALTA) 30 mg capsule 3 Sig: Take 1 Cap by mouth daily. Class: Print Route: Oral  
  
We Performed the Following CBC W/O DIFF [71887 CPT(R)] HEMOGLOBIN A1C WITH EAG [14218 CPT(R)] METABOLIC PANEL, COMPREHENSIVE [67990 CPT(R)] REFERRAL TO NEUROLOGY [WPC98 Custom] Comments:  
 Please evaluate patient for falls/balance issues. REFERRAL TO PHYSICAL THERAPY [PIH54 Custom] Comments:  
 [facility] #[phone] Physical Therapy evaluate and treat:  Fall prevention, back pain Please call Augieaden Beltre to help arrange and authorize your tests and/or referrals. Her # is [phone] TSH 3RD GENERATION [01934 CPT(R)] VITAMIN B12 [id number] CPT(R)] Follow-up Instructions Return in about 4 weeks (around 6/1/2017). To-Do List   
 05/04/2017 Imaging:  XR SPINE LUMB 2 OR 3 V Referral Information Referral ID Referred By Referred To  
  
 [id number] Lavelle Donato Not Available Visits Status Start Date End Date 1 New Request 5/4/17 5/4/18 If your referral has a status of pending review or denied, additional information will be sent to support the outcome of this decision. Referral ID Referred By Referred To [id number] Fidel Wright MD  
   [place] [address] [address] [facility] [address] [facility] Phone: [phone] Fax: [phone] Visits Status Start Date End Date 1 New Request 5/4/17 5/4/18 If your referral has a status of pending review or denied, additional information will be sent to support the outcome of this decision. Patient Instructions Because of your smoking history (greater than 30 pack years by your history), I suggest a yearly low dose CT scan as a screen for lung cancer. Obtain physical therapy [facility]eds #[phone] physical Therapy evaluate and treat:  Fall prevention, back pain See neurologist Dr. Regis Quigley about your falls:  #[phone] Please call Joe Pryor to help arrange and authorize your tests and/or referrals. Her # is [phone]  
 
 
consider Mg 173 at [phone] or [phone] after hours National suicide prevention hotline [phone] Stop smoking Stop drinking alcohol Labs today Follow up in 2-4 weeks Introducing [facility]! [facility] introduces [employer] patient portal. Now you can access parts of your medical record, email your doctor's office, and request medication refills online. 1. In your internet browser, go to [email or web address] 2. Click on the First Time User? Click Here link in the Sign In box. You will see the New Member Sign Up page. 3. Enter your Sweet[employer] Access Code exactly as it appears below. You will not need to use this code after youve completed the sign-up process. If you do not sign up before the expiration date, you must request a new code. · [employer] Access Code: [email or web address] Expires: 5/28/2017 12:21 PM 
 
4. Enter the last four digits of your Social Security Number (xxxx) and Date of Birth (mm/dd/yyyy) as indicated and click Submit. You will be taken to the next sign-up page. 5. Create a [employer]ion[employer] ID. This will be your [employer]ion[employer] login ID and cannot be changed, so think of one that is secure and easy to remember. 6. Create a [employer]ion[employer] password. You can change your password at any time. 7. Enter your Password Reset Question and Answer. This can be used at a later time if you forget your password. 8. Enter your e-mail address. You will receive e-mail notification when new information is available in [address] 9. Click Sign Up. You can now view and download portions of your medical record. 10. Click the Download Summary menu link to download a portable copy of your medical information. If you have questions, please visit the Frequently Asked Questions section of the [employer] website. Remember, [employer] is NOT to be used for urgent needs. For medical emergencies, dial 911. Now available from your iPhone and Android! Please provide this summary of care documentation to your next provider. Your primary care clinician is listed as Jhonny Whyte. If you have any questions after today's visit, please call [phone].

## 2017-05-05 LAB
ALBUMIN SERPL-MCNC: 4.6 G/DL (ref 3.6–4.8)
ALBUMIN/GLOB SERPL: 1.7 {RATIO} (ref 1.2–2.2)
ALP SERPL-CCNC: 59 IU/L (ref 39–117)
ALT SERPL-CCNC: 8 IU/L (ref 0–32)
AST SERPL-CCNC: 15 IU/L (ref 0–40)
BILIRUB SERPL-MCNC: 0.4 MG/DL (ref 0–1.2)
BUN SERPL-MCNC: 7 MG/DL (ref 8–27)
BUN/CREAT SERPL: 11 (ref 12–28)
CALCIUM SERPL-MCNC: 9.3 MG/DL (ref 8.7–10.3)
CHLORIDE SERPL-SCNC: 97 MMOL/L (ref 96–106)
CO2 SERPL-SCNC: 21 MMOL/L (ref 18–29)
CREAT SERPL-MCNC: 0.65 MG/DL (ref 0.57–1)
ERYTHROCYTE [DISTWIDTH] IN BLOOD BY AUTOMATED COUNT: 14.3 % (ref 12.3–15.4)
EST. AVERAGE GLUCOSE BLD GHB EST-MCNC: 120 MG/DL
GLOBULIN SER CALC-MCNC: 2.7 G/DL (ref 1.5–4.5)
GLUCOSE SERPL-MCNC: 90 MG/DL (ref 65–99)
HBA1C MFR BLD: 5.8 % (ref 4.8–5.6)
HCT VFR BLD AUTO: 39.8 % (ref 34–46.6)
HGB BLD-MCNC: 13.2 G/DL (ref 11.1–15.9)
MCH RBC QN AUTO: 31.9 PG (ref 26.6–33)
MCHC RBC AUTO-ENTMCNC: 33.2 G/DL (ref 31.5–35.7)
MCV RBC AUTO: 96 FL (ref 79–97)
PLATELET # BLD AUTO: 274 X10E3/UL (ref 150–379)
POTASSIUM SERPL-SCNC: 4.8 MMOL/L (ref 3.5–5.2)
PROT SERPL-MCNC: 7.3 G/DL (ref 6–8.5)
RBC # BLD AUTO: 4.14 X10E6/UL (ref 3.77–5.28)
SODIUM SERPL-SCNC: 135 MMOL/L (ref 134–144)
TSH SERPL DL<=0.005 MIU/L-ACNC: 0.68 UIU/ML (ref 0.45–4.5)
VIT B12 SERPL-MCNC: 436 PG/ML (ref 211–946)
WBC # BLD AUTO: 4.1 X10E3/UL (ref 3.4–10.8)

## 2017-05-07 NOTE — PROGRESS NOTES
Your blood tests are stable. Avoid tobacco and alcohol. Your thyroid is stable on your present dose of Synthroid 150 mcg daily. Please continue this dose and recheck in 12 months.

## 2017-08-23 DIAGNOSIS — I10 ESSENTIAL HYPERTENSION WITH GOAL BLOOD PRESSURE LESS THAN 140/90: ICD-10-CM

## 2017-08-23 RX ORDER — LISINOPRIL 5 MG/1
TABLET ORAL
Qty: 21 TAB | Refills: 9 | OUTPATIENT
Start: 2017-08-23

## 2017-08-23 NOTE — TELEPHONE ENCOUNTER
Call transferred from [address] [place] [address]. Spoke with patient and said she took the last medication on Monday. Said she went to [facility], and they told her she had no refills left. Also they told her physician had denied the refill request.  She asked for a few pills from the pharmacy and was told they could not do that. Calling the office now to ask why the refusal for the medication. Said she needs her medication for blood pressure.

## 2017-08-24 DIAGNOSIS — I10 ESSENTIAL HYPERTENSION: Primary | ICD-10-CM

## 2017-08-24 RX ORDER — LISINOPRIL 5 MG/1
TABLET ORAL DAILY
Status: CANCELLED | OUTPATIENT
Start: 2017-08-24 | End: 2017-08-31

## 2017-08-24 RX ORDER — LISINOPRIL 5 MG/1
5 TABLET ORAL DAILY
Qty: 30 TAB | Refills: 0 | Status: SHIPPED | OUTPATIENT
Start: 2017-08-24 | End: 2017-09-05 | Stop reason: SDUPTHER

## 2017-08-24 NOTE — TELEPHONE ENCOUNTER
Patient calling for status of medication refill for blood pressure medication. Patient is completely out of medication since Monday.

## 2017-09-05 ENCOUNTER — OFFICE VISIT (OUTPATIENT)
Dept: FAMILY MEDICINE CLINIC | Age: 62
End: 2017-09-05

## 2017-09-05 VITALS
OXYGEN SATURATION: 97 % | HEIGHT: 69 IN | DIASTOLIC BLOOD PRESSURE: 84 MMHG | TEMPERATURE: 98.5 F | HEART RATE: 77 BPM | WEIGHT: 152 LBS | RESPIRATION RATE: 16 BRPM | BODY MASS INDEX: 22.51 KG/M2 | SYSTOLIC BLOOD PRESSURE: 147 MMHG

## 2017-09-05 DIAGNOSIS — R45.89 DEPRESSED AFFECT: ICD-10-CM

## 2017-09-05 DIAGNOSIS — E03.4 HYPOTHYROIDISM DUE TO ACQUIRED ATROPHY OF THYROID: ICD-10-CM

## 2017-09-05 DIAGNOSIS — Z12.31 ENCOUNTER FOR SCREENING MAMMOGRAM FOR BREAST CANCER: ICD-10-CM

## 2017-09-05 DIAGNOSIS — Z12.11 COLON CANCER SCREENING: Primary | ICD-10-CM

## 2017-09-05 RX ORDER — DULOXETIN HYDROCHLORIDE 30 MG/1
30 CAPSULE, DELAYED RELEASE ORAL DAILY
Qty: 30 CAP | Refills: 3 | Status: SHIPPED | OUTPATIENT
Start: 2017-09-05 | End: 2018-02-14 | Stop reason: ALTCHOICE

## 2017-09-05 RX ORDER — ERGOCALCIFEROL 1.25 MG/1
50000 CAPSULE ORAL
Qty: 8 CAP | Refills: 0 | Status: SHIPPED | OUTPATIENT
Start: 2017-09-05 | End: 2017-11-02 | Stop reason: SDUPTHER

## 2017-09-05 RX ORDER — LEVOTHYROXINE SODIUM 150 UG/1
TABLET ORAL
Qty: 90 TAB | Refills: 3 | Status: SHIPPED | OUTPATIENT
Start: 2017-09-05 | End: 2018-02-14 | Stop reason: SDUPTHER

## 2017-09-05 RX ORDER — LISINOPRIL 5 MG/1
5 TABLET ORAL DAILY
Qty: 90 TAB | Refills: 3 | Status: SHIPPED | OUTPATIENT
Start: 2017-09-05 | End: 2018-02-14 | Stop reason: SDUPTHER

## 2017-09-05 NOTE — PROGRESS NOTES
Chief Complaint   Patient presents with    Hypertension     follow up     1. Have you been to the ER, urgent care clinic since your last visit? Hospitalized since your last visit? No    2. Have you seen or consulted any other health care providers outside of the [facility] since your last visit? Include any pap smears or colon screening.  No

## 2017-09-05 NOTE — PATIENT INSTRUCTIONS
I strongly suggest that you avoid use of any tobacco products. This may be the most important thing you can do for your health. While medicines may help, the most important thing for you is the decision to quit. If you need a \"Quit \", please call 0-923-QUIT NOW ([phone]). If you need help with nicotine withdrawal, I suggest over-the-counter nicotine replacement aids such as nicotine gum or patches, used as directed. As you may know, use of tobacco products increases your risk for many forms of cancer, heart disease, stroke, and blood clotting. Your body is very forgiving. Quit now and improve your health! Refills done    Return your stool cards when ready    Screening mammogram soon    Please call Mariela to help arrange and authorize any tests and/or referrals.   Her # is [phone] at Cori Lance is #[phone]
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
## 2017-09-05 NOTE — MR AVS SNAPSHOT
Visit Information Date & Time Provider Department Dept. Phone Encounter #  
 9/5/2017  3:15 PM Zeb Albarado MD [address] [place] [address] [phone] [id number] Upcoming Health Maintenance Date Due FOBT Q 1 YEAR AGE 50-75 9/28/2017 BREAST CANCER SCRN MAMMOGRAM 10/15/2017 PAP AKA CERVICAL CYTOLOGY 9/28/2019 DTaP/Tdap/Td series (2 - Td) 9/28/2026 Allergies as of 9/5/2017  Review Complete On: 9/5/2017 By: Zeb Albarado MD  
 No Known Allergies Current Immunizations  Reviewed on 10/2/2013 No immunizations on file. Not reviewed this visit You Were Diagnosed With   
  
 Codes Comments Colon cancer screening    -  Primary ICD-10-CM: Z12.11 ICD-9-CM: V76.51 Depressed affect     ICD-10-CM: R45.89 ICD-9-CM: 858 Hypothyroidism due to acquired atrophy of thyroid     ICD-10-CM: E03.4 ICD-9-CM: 244.8, 246.8 Encounter for screening mammogram for breast cancer     ICD-10-CM: Z12.31 
ICD-9-CM: V76.12 Vitals BP Pulse Temp Resp Height(growth percentile) Weight(growth percentile) 147/84 77 98.5 °F (36.9 °C) (Oral) 16 5' 9\" (1.753 m) 152 lb (68.9 kg) SpO2 BMI OB Status Smoking Status 97% 22.45 kg/m2 Hysterectomy Current Every Day Smoker Vitals History BMI and BSA Data Body Mass Index Body Surface Area  
 22.45 kg/m 2 1.83 m 2 Preferred Pharmacy Pharmacy Name Phone [facility]  [place] [phone] Your Updated Medication List  
  
   
This list is accurate as of: 9/5/17  4:05 PM.  Always use your most recent med list.  
  
  
  
  
 DAILY MULTI-VITAMINS/IRON tablet Generic drug:  multivitamin with iron Take 1 Tab by mouth daily. DULoxetine 30 mg capsule Commonly known as:  CYMBALTA Take 1 Cap by mouth daily. ergocalciferol 50,000 unit capsule Commonly known as:  VITAMIN D2 Take 1 Cap by mouth every seven (7) days. levothyroxine 150 mcg tablet Commonly known as:  SYNTHROID  
TAKE ONE TABLET BY MOUTH DAILY BEFORE BREAKFAST  
  
 lisinopril 5 mg tablet Commonly known as:  Daphen Frey Take 1 Tab by mouth daily. Prescriptions Sent to Pharmacy Refills  
 lisinopril (PRINIVIL, ZESTRIL) 5 mg tablet 3 Sig: Take 1 Tab by mouth daily. Class: Normal  
 Pharmacy: [facility] [address] [place], [place] [address] Ph #: [phone] Route: Oral  
 DULoxetine (CYMBALTA) 30 mg capsule 3 Sig: Take 1 Cap by mouth daily. Class: Normal  
 Pharmacy: [facility] [address] [place] [address] [facility] Ph #: [phone] Route: Oral  
 levothyroxine (SYNTHROID) 150 mcg tablet 3 Sig: TAKE ONE TABLET BY MOUTH DAILY BEFORE BREAKFAST Class: Normal  
 Pharmacy: [facility] [address] [place] [address] Ph #: [phone]  
 ergocalciferol (VITAMIN D2) 50,000 unit capsule 0 Sig: Take 1 Cap by mouth every seven (7) days. Class: Normal  
 Pharmacy: [facility] [address] [place] [address] [facility] Ph #: [phone] Route: Oral  
  
We Performed the Following OCCULT BLOOD, IMMUNOASSAY (FIT) [id number] CPT(R)] To-Do List   
 09/05/2017 Imaging:  [facility] MAMMO BI SCREENING INCL CAD Patient Instructions I strongly suggest that you avoid use of any tobacco products. This may be the most important thing you can do for your health. While medicines may help, the most important thing for you is the decision to quit. If you need a \"Quit \", please call 5-278-QUIT NOW ([phone]). If you need help with nicotine withdrawal, I suggest over-the-counter nicotine replacement aids such as nicotine gum or patches, used as directed. As you may know, use of tobacco products increases your risk for many forms of cancer, heart disease, stroke, and blood clotting.   Your body is very forgiving. Quit now and improve your health! Refills done Return your stool cards when ready Screening mammogram soon Please call Mikey Aceves to help arrange and authorize any tests and/or referrals. Her # is [phone] Central Scheduling at [facility] is [phone] Introducing [facility]! [facility] introduces Autom[employer]It patient portal. Now you can access parts of your medical record, email your doctor's office, and request medication refills online. 1. In your internet browser, go to [email or web address] 2. Click on the First Time User? Click Here link in the Sign In box. You will see the New Member Sign Up page. 3. Enter your AutomateIt Access Code exactly as it appears below. You will not need to use this code after youve completed the sign-up process. If you do not sign up before the expiration date, you must request a new code. · AutomateIt Access Code: [email or web address] Expires: 12/4/2017  4:04 PM 
 
4. Enter the last four digits of your Social Security Number (xxxx) and Date of Birth (mm/dd/yyyy) as indicated and click Submit. You will be taken to the next sign-up page. 5. Create a AutomateIt ID. This will be your AutomateIt login ID and cannot be changed, so think of one that is secure and easy to remember. 6. Create a [employer]ateIt password. You can change your password at any time. 7. Enter your Password Reset Question and Answer. This can be used at a later time if you forget your password. 8. Enter your e-mail address. You will receive e-mail notification when new information is available in [address] 9. Click Sign Up. You can now view and download portions of your medical record. 10. Click the Download Summary menu link to download a portable copy of your medical information. If you have questions, please visit the Frequently Asked Questions section of the AutomateIt website.  Remember, AutomateIt is NOT to be used for urgent needs. For medical emergencies, dial 911. Now available from your iPhone and Android! Please provide this summary of care documentation to your next provider. Your primary care clinician is listed as Ashley Gordon. If you have any questions after today's visit, please call [phone].

## 2017-09-11 ENCOUNTER — HOSPITAL ENCOUNTER (OUTPATIENT)
Dept: MAMMOGRAPHY | Age: 62
Discharge: HOME | End: 2017-09-11
Payer: COMMERCIAL

## 2017-09-11 DIAGNOSIS — Z12.31 ENCOUNTER FOR SCREENING MAMMOGRAM FOR BREAST CANCER: ICD-10-CM

## 2017-09-11 PROCEDURE — 77067 SCR MAMMO BI INCL CAD: CPT

## 2018-02-01 ENCOUNTER — TELEPHONE (OUTPATIENT)
Dept: FAMILY MEDICINE CLINIC | Age: 63
End: 2018-02-01

## 2018-02-14 ENCOUNTER — OFFICE VISIT (OUTPATIENT)
Dept: FAMILY MEDICINE CLINIC | Age: 63
End: 2018-02-14

## 2018-02-14 VITALS
WEIGHT: 152 LBS | HEIGHT: 69 IN | BODY MASS INDEX: 22.51 KG/M2 | OXYGEN SATURATION: 100 % | SYSTOLIC BLOOD PRESSURE: 166 MMHG | TEMPERATURE: 98.1 F | HEART RATE: 66 BPM | RESPIRATION RATE: 20 BRPM | DIASTOLIC BLOOD PRESSURE: 96 MMHG

## 2018-02-14 DIAGNOSIS — Z13.220 LIPID SCREENING: ICD-10-CM

## 2018-02-14 DIAGNOSIS — J06.9 UPPER RESPIRATORY TRACT INFECTION, UNSPECIFIED TYPE: ICD-10-CM

## 2018-02-14 DIAGNOSIS — R26.9 GAIT DIFFICULTY: ICD-10-CM

## 2018-02-14 DIAGNOSIS — R79.89 LOW VITAMIN D LEVEL: ICD-10-CM

## 2018-02-14 DIAGNOSIS — F10.10 ALCOHOL ABUSE: ICD-10-CM

## 2018-02-14 DIAGNOSIS — Z91.81 RISK FOR FALLS: ICD-10-CM

## 2018-02-14 DIAGNOSIS — I10 ESSENTIAL HYPERTENSION: Primary | ICD-10-CM

## 2018-02-14 DIAGNOSIS — F43.21 ADJUSTMENT DISORDER WITH DEPRESSED MOOD: ICD-10-CM

## 2018-02-14 DIAGNOSIS — Z13.1 SCREENING FOR DIABETES MELLITUS: ICD-10-CM

## 2018-02-14 DIAGNOSIS — Z13.31 SCREENING FOR DEPRESSION: ICD-10-CM

## 2018-02-14 DIAGNOSIS — E03.4 HYPOTHYROIDISM DUE TO ACQUIRED ATROPHY OF THYROID: ICD-10-CM

## 2018-02-14 DIAGNOSIS — Z12.11 COLON CANCER SCREENING: ICD-10-CM

## 2018-02-14 RX ORDER — ERGOCALCIFEROL 1.25 MG/1
50000 CAPSULE ORAL
Qty: 12 CAP | Refills: 0 | Status: SHIPPED | OUTPATIENT
Start: 2018-02-14 | End: 2019-04-12 | Stop reason: SDUPTHER

## 2018-02-14 RX ORDER — AZITHROMYCIN 250 MG/1
TABLET, FILM COATED ORAL
Qty: 6 TAB | Refills: 0 | Status: SHIPPED | OUTPATIENT
Start: 2018-02-14 | End: 2018-02-19

## 2018-02-14 RX ORDER — LISINOPRIL 10 MG/1
10 TABLET ORAL DAILY
Qty: 90 TAB | Refills: 3 | Status: SHIPPED | OUTPATIENT
Start: 2018-02-14 | End: 2019-03-09 | Stop reason: SDUPTHER

## 2018-02-14 RX ORDER — BUPROPION HYDROCHLORIDE 150 MG/1
150 TABLET ORAL
Qty: 30 TAB | Refills: 0 | Status: SHIPPED | OUTPATIENT
Start: 2018-02-14 | End: 2020-11-17

## 2018-02-14 RX ORDER — LEVOTHYROXINE SODIUM 150 UG/1
TABLET ORAL
Qty: 90 TAB | Refills: 3 | Status: SHIPPED | OUTPATIENT
Start: 2018-02-14 | End: 2019-03-09 | Stop reason: SDUPTHER

## 2018-02-14 NOTE — LETTER
2/18/2018 11:14 AM 
 
Ms. Marquez Press [address] [facility] [address] Dear Jimmy Press: 
 
Please find your most recent results below. Resulted Orders HEMOGLOBIN A1C WITH EAG Result Value Ref Range Hemoglobin A1c 5.4 4.8 - 5.6 % Comment:  
            Pre-diabetes: 5.7 - 6.4 Diabetes: >6.4 Glycemic control for adults with diabetes: <7.0 Estimated average glucose 108 mg/dL Narrative Performed at:  [address]  [id number] : Laura Mckeon MD, Phone:  [phone] LIPID PANEL Result Value Ref Range Cholesterol, total 204 (H) 100 - 199 mg/dL Triglyceride 97 0 - 149 mg/dL HDL Cholesterol 92 >39 mg/dL VLDL, calculated 19 5 - 40 mg/dL LDL, calculated 93 0 - 99 mg/dL Narrative Performed at:  [address]  [id number] : Laura Mckeon MD, Phone:  [phone] CBC W/O DIFF Result Value Ref Range WBC 3.9 3.4 - 10.8 x10E3/uL  
 RBC 3.98 3.77 - 5.28 x10E6/uL HGB 12.6 11.1 - 15.9 g/dL HCT 37.2 34.0 - 46.6 % MCV 94 79 - 97 fL  
 MCH 31.7 26.6 - 33.0 pg  
 MCHC 33.9 31.5 - 35.7 g/dL  
 RDW 14.7 12.3 - 15.4 % PLATELET 752 194 - 481 x10E3/uL Narrative Performed at:  [address]  [id number] : Laura Mckeon MD, Phone:  [phone] METABOLIC PANEL, COMPREHENSIVE Result Value Ref Range Glucose 82 65 - 99 mg/dL BUN 8 8 - 27 mg/dL Creatinine 0.72 0.57 - 1.00 mg/dL GFR est non-AA 89 >59 mL/min/1.73 GFR est  >59 mL/min/1.73  
 BUN/Creatinine ratio 11 (L) 12 - 28 Sodium 139 134 - 144 mmol/L Potassium 4.4 3.5 - 5.2 mmol/L Chloride 100 96 - 106 mmol/L  
 CO2 26 18 - 29 mmol/L Calcium 9.6 8.7 - 10.3 mg/dL Protein, total 7.2 6.0 - 8.5 g/dL Albumin 4.7 3.6 - 4.8 g/dL GLOBULIN, TOTAL 2.5 1.5 - 4.5 g/dL A-G Ratio 1.9 1.2 - 2.2 Bilirubin, total 0.6 0.0 - 1.2 mg/dL Alk. phosphatase 48 39 - 117 IU/L  
 AST (SGOT) 15 0 - 40 IU/L  
 ALT (SGPT) 11 0 - 32 IU/L Narrative Performed at:  [address]  [id number] : Mandie Ramirez MD, Phone:  [phone] VITAMIN B12 Result Value Ref Range Vitamin B12 538 232 - 1245 pg/mL Narrative Performed at:  [address]  [id number] : Mandie Ramirez MD, Phone:  [phone] VITAMIN D, 25 HYDROXY Result Value Ref Range VITAMIN D, 25-HYDROXY 22.0 (L) 30.0 - 100.0 ng/mL Comment:  
   Vitamin D deficiency has been defined by the [address] [place] [address] practice guideline as a 
level of serum 25-OH vitamin D less than 20 ng/mL (1,2). The Endocrine Society went on to further define vitamin D 
insufficiency as a level between 21 and 29 ng/mL (2). 1. IOM ([place] of Medicine). 2010. Dietary reference 
   intakes for calcium and D. [facility]: The 
   [employer]. 2. Lizandro MF, Jerry NC, Maribel VELEZ, et al. 
   Evaluation, treatment, and prevention of vitamin D 
   deficiency: an Endocrine Society clinical practice 
   guideline. JCEM. 2011 Jul; 96(7):1911-30. Narrative Performed at:  [address]  [id number] : Mandie Ramirez MD, Phone:  [phone] CVD REPORT Result Value Ref Range INTERPRETATION Note Comment:  
   Supplemental report is available. Narrative Performed at:  [address] [place] [address]  [id number] : Ezequiel Montes De Oca PhD, Phone:  [phone] RECOMMENDATIONS: 
 
Your labs look OK but your vitamin D remains low.  Continue the prescpirtion vitamin D once a month and use over the counter vitamin D 1000 mg daily Sincerely, 
 
 
 Demetrius Mcintyre MD

## 2018-02-14 NOTE — PATIENT INSTRUCTIONS
Obtain physical therapy    Please call Mini Baker to help arrange and authorize any tests and/or referrals. Her # is [phone]     Central Scheduling at [facility] is #[phone]    Labs today    Avoid tobacco and alcohol    Start wellbutrin 150 mg one pill daily for nerves    Follow up in 2 weeks    Take zithromax for your respiratory infection. Take this with food.

## 2018-02-14 NOTE — LETTER
2/14/2018 11:43 AM 
 
Ms. Saud Vazquez [address] [address] I strongly suggest that you avoid use of any tobacco products. This may be the most important thing you can do for your health. While medicines may help, the most important thing for you is the decision to quit. If you need a \"Quit \", please call 3-769-QUIT NOW ([phone]). If you need help with nicotine withdrawal, I suggest over-the-counter nicotine replacement aids such as nicotine gum or patches, used as directed. As you may know, use of tobacco products increases your risk for many forms of cancer, heart disease, stroke, and blood clotting. Your body is very forgiving. Quit now and improve your health! I agree with trying to quit using alcohol. Sincerely, Annika Payan MD
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
## 2018-02-14 NOTE — MR AVS SNAPSHOT
[address] 
[phone] Patient: Julio Muller MRN: [id number] RGP:[birth year] Visit Information Date & Time Provider Department Dept. Phone Encounter #  
 2/14/2018 10:30 AM Junior Lizz MD [id number] [facility] [phone] [id number] Upcoming Health Maintenance Date Due FOBT Q 1 YEAR AGE 50-75 9/28/2017 BREAST CANCER SCRN MAMMOGRAM 9/11/2019 PAP AKA CERVICAL CYTOLOGY 9/28/2019 DTaP/Tdap/Td series (2 - Td) 9/28/2026 Allergies as of 2/14/2018  Review Complete On: 2/14/2018 By: Junior Lizz MD  
 No Known Allergies Current Immunizations  Reviewed on 10/2/2013 No immunizations on file. Not reviewed this visit You Were Diagnosed With   
  
 Codes Comments Essential hypertension    -  Primary ICD-10-CM: I10 
ICD-9-CM: 401.9 not optimal, will adjust Rx Low vitamin D level     ICD-10-CM: E55.9 ICD-9-CM: 268.9 Hypothyroidism due to acquired atrophy of thyroid     ICD-10-CM: E03.4 ICD-9-CM: 244.8, 246.8 Adjustment disorder with depressed mood     ICD-10-CM: F43.21 ICD-9-CM: 309.0 PHQ9 = 12. try WELLBTRIN  mg. RTC 2 weeks Risk for falls     ICD-10-CM: Z91.81 
ICD-9-CM: V15.88 Colon cancer screening     ICD-10-CM: Z12.11 ICD-9-CM: V76.51 Screening for diabetes mellitus     ICD-10-CM: Z13.1 ICD-9-CM: V77.1 Lipid screening     ICD-10-CM: H46.281 ICD-9-CM: V77.91 Gait difficulty     ICD-10-CM: R26.9 ICD-9-CM: 911. 2 Upper respiratory tract infection, unspecified type     ICD-10-CM: J06.9 ICD-9-CM: 465.9 Vitals BP Pulse Temp Resp Height(growth percentile) Weight(growth percentile) (!) 166/96 (BP 1 Location: Left arm, BP Patient Position: Sitting) 66 98.1 °F (36.7 °C) (Oral) 20 5' 9\" (1.753 m) 152 lb (68.9 kg) SpO2 BMI OB Status Smoking Status 100% 22.45 kg/m2 Hysterectomy Current Every Day Smoker Vitals History BMI and BSA Data Body Mass Index Body Surface Area  
 22.45 kg/m 2 1.83 m 2 Preferred Pharmacy Pharmacy Name Phone [facility] [phone] Your Updated Medication List  
  
   
This list is accurate as of: 2/14/18 11:42 AM.  Always use your most recent med list.  
  
  
  
  
 azithromycin 250 mg tablet Commonly known as:  Elie Barnesi Take 2 tablets today, then take 1 tablet daily buPROPion  mg tablet Commonly known as:  Vergiquinn Brownkers Take 1 Tab by mouth every morning. DAILY MULTI-VITAMINS/IRON tablet Generic drug:  multivitamin with iron Take 1 Tab by mouth daily. ergocalciferol 50,000 unit capsule Commonly known as:  VITAMIN D2 Take 1 Cap by mouth every month. levothyroxine 150 mcg tablet Commonly known as:  SYNTHROID  
TAKE ONE TABLET BY MOUTH DAILY BEFORE BREAKFAST  
  
 lisinopril 10 mg tablet Commonly known as:  Ladona Dunks Take 1 Tab by mouth daily. Prescriptions Sent to Pharmacy Refills  
 ergocalciferol (VITAMIN D2) 50,000 unit capsule 0 Sig: Take 1 Cap by mouth every month. Class: Normal  
 Pharmacy: [address] Ph #: [phone] Route: Oral  
 lisinopril (PRINIVIL, ZESTRIL) 10 mg tablet 3 Sig: Take 1 Tab by mouth daily. Class: Normal  
 Pharmacy: [address] Ph #: [phone] Route: Oral  
 levothyroxine (SYNTHROID) 150 mcg tablet 3 Sig: TAKE ONE TABLET BY MOUTH DAILY BEFORE BREAKFAST Class: Normal  
 Pharmacy: [address] Ph #: [phone]  
 buPROPion XL (WELLBUTRIN XL) 150 mg tablet 0 Sig: Take 1 Tab by mouth every morning.   
 Class: Normal  
 Pharmacy: [address] [place] Ph #: [phone] Route: Oral  
 azithromycin (ZITHROMAX) 250 mg tablet 0 Sig: Take 2 tablets today, then take 1 tablet daily Class: Normal  
 Pharmacy: El[facility] Ruvalc[facility] [address] [place] [address] Cliff Randall Doreen Ph #: [phone] We Performed the Following CBC W/O DIFF [04619 CPT(R)] HEMOGLOBIN A1C WITH EAG [69908 CPT(R)] LIPID PANEL [88525 CPT(R)] METABOLIC PANEL, COMPREHENSIVE [41313 CPT(R)] OCCULT BLOOD, IMMUNOASSAY (FIT) [id number] CPT(R)] REFERRAL TO PHYSICAL THERAPY [KBZ28 Custom] VITAMIN B12 [id number] CPT(R)] VITAMIN D, 25 HYDROXY [id number] CPT(R)] Referral Information Referral ID Referred By Referred To  
  
 [id number] [address] [place] [address] Phone: [phone] Visits Status Start Date End Date 1 New Request 2/14/18 2/14/19 If your referral has a status of pending review or denied, additional information will be sent to support the outcome of this decision. Patient Instructions Obtain physical therapy Please call [facility] to help arrange and authorize any tests and/or referrals. Her # is [phone] Central Scheduling at [facility] is [phone] Labs today Avoid tobacco and alcohol Start wellbutrin 150 mg one pill daily for nerves Follow up in 2 weeks Take zithromax for your respiratory infection. Take this with food. Please provide this summary of care documentation to your next provider. Your primary care clinician is listed as Ilia Torres. If you have any questions after today's visit, please call [phone].

## 2018-02-15 ENCOUNTER — TELEPHONE (OUTPATIENT)
Dept: FAMILY MEDICINE CLINIC | Age: 63
End: 2018-02-15

## 2018-02-15 NOTE — TELEPHONE ENCOUNTER
Patient is requesting to speak with [facility] regarding her referral to [facility].  She states that some one here set her up but they set her up with the wrong place

## 2018-02-16 ENCOUNTER — TELEPHONE (OUTPATIENT)
Dept: FAMILY MEDICINE CLINIC | Age: 63
End: 2018-02-16

## 2018-02-16 LAB
25(OH)D3+25(OH)D2 SERPL-MCNC: 22 NG/ML (ref 30–100)
ALBUMIN SERPL-MCNC: 4.7 G/DL (ref 3.6–4.8)
ALBUMIN/GLOB SERPL: 1.9 {RATIO} (ref 1.2–2.2)
ALP SERPL-CCNC: 48 IU/L (ref 39–117)
ALT SERPL-CCNC: 11 IU/L (ref 0–32)
AST SERPL-CCNC: 15 IU/L (ref 0–40)
BILIRUB SERPL-MCNC: 0.6 MG/DL (ref 0–1.2)
BUN SERPL-MCNC: 8 MG/DL (ref 8–27)
BUN/CREAT SERPL: 11 (ref 12–28)
CALCIUM SERPL-MCNC: 9.6 MG/DL (ref 8.7–10.3)
CHLORIDE SERPL-SCNC: 100 MMOL/L (ref 96–106)
CHOLEST SERPL-MCNC: 204 MG/DL (ref 100–199)
CO2 SERPL-SCNC: 26 MMOL/L (ref 18–29)
CREAT SERPL-MCNC: 0.72 MG/DL (ref 0.57–1)
ERYTHROCYTE [DISTWIDTH] IN BLOOD BY AUTOMATED COUNT: 14.7 % (ref 12.3–15.4)
EST. AVERAGE GLUCOSE BLD GHB EST-MCNC: 108 MG/DL
GFR SERPLBLD CREATININE-BSD FMLA CKD-EPI: 102 ML/MIN/1.73
GFR SERPLBLD CREATININE-BSD FMLA CKD-EPI: 89 ML/MIN/1.73
GLOBULIN SER CALC-MCNC: 2.5 G/DL (ref 1.5–4.5)
GLUCOSE SERPL-MCNC: 82 MG/DL (ref 65–99)
HBA1C MFR BLD: 5.4 % (ref 4.8–5.6)
HCT VFR BLD AUTO: 37.2 % (ref 34–46.6)
HDLC SERPL-MCNC: 92 MG/DL
HGB BLD-MCNC: 12.6 G/DL (ref 11.1–15.9)
INTERPRETATION, 910389: NORMAL
LDLC SERPL CALC-MCNC: 93 MG/DL (ref 0–99)
MCH RBC QN AUTO: 31.7 PG (ref 26.6–33)
MCHC RBC AUTO-ENTMCNC: 33.9 G/DL (ref 31.5–35.7)
MCV RBC AUTO: 94 FL (ref 79–97)
PLATELET # BLD AUTO: 295 X10E3/UL (ref 150–379)
POTASSIUM SERPL-SCNC: 4.4 MMOL/L (ref 3.5–5.2)
PROT SERPL-MCNC: 7.2 G/DL (ref 6–8.5)
RBC # BLD AUTO: 3.98 X10E6/UL (ref 3.77–5.28)
SODIUM SERPL-SCNC: 139 MMOL/L (ref 134–144)
TRIGL SERPL-MCNC: 97 MG/DL (ref 0–149)
VIT B12 SERPL-MCNC: 538 PG/ML (ref 232–1245)
VLDLC SERPL CALC-MCNC: 19 MG/DL (ref 5–40)
WBC # BLD AUTO: 3.9 X10E3/UL (ref 3.4–10.8)

## 2018-02-16 NOTE — TELEPHONE ENCOUNTER
Call transferred from [employer] at 4:04 PM in which she said the patient would not give her any information and was very loud and aggressive with her. Told her that she only wanted to speak with her doctor's nurse. She did give me the patient's phone number on caller ID to pull up and it was this patient. I asked her to transfer the call while the patient was still talking to her the whole time. She said her brother passed away this morning around 5:00 pm.     Patient stated she only wanted to speak with the nurse to schedule her physical therapy appointment. I asked her where she was going and she said next door, [facility]. Asked her to make her own appointment, she said no, and to send the order. Advised her the nurse does not schedule her appointments and the message would be sent to referrals.

## 2018-02-18 PROBLEM — E55.9 VITAMIN D DEFICIENCY: Status: ACTIVE | Noted: 2018-02-18

## 2018-02-18 NOTE — PROGRESS NOTES
Your labs look OK but your vitamin D remains low.   Continue the prescpirtion vitamin D once a month and use over the counter vitamin D 1000 mg daily

## 2018-02-21 NOTE — TELEPHONE ENCOUNTER
Dr. Dawn Sorensen  Received: Today       Rachel MILLER [facility] Front Office                     Pt returning a miss call from [facility]. Pt was on hold and hung up due to the time of holding. Pt seems upset.  No contact number was left, called from [phone].

## 2018-02-21 NOTE — TELEPHONE ENCOUNTER
Spoke with patient regarding her appointment with [facility]. .. Asked patient if she made her appointment & she stated to me \"YOU DID'NT MAKE IT\" & phone was disconnected at that point. ... Patient was a bit upset that we did not make appointment for her. ... Referral done. ... See documentation under referral tab. ...

## 2018-02-26 ENCOUNTER — TELEPHONE (OUTPATIENT)
Dept: FAMILY MEDICINE CLINIC | Age: 63
End: 2018-02-26

## 2018-02-26 NOTE — TELEPHONE ENCOUNTER
Dr. Ramirez Krueger Telephone  Received: Today       Alvarado MILLER [facility] Front Office                     Jon Hodge from [address] [place] [address] is requesting office notes from pt.'s last visit. Kitty's best contact [phone].  Fax [phone].

## 2018-02-28 ENCOUNTER — TELEPHONE (OUTPATIENT)
Dept: FAMILY MEDICINE CLINIC | Age: 63
End: 2018-02-28

## 2018-02-28 NOTE — TELEPHONE ENCOUNTER
I spoke with the patient, she was still upset after I explained the situation with her. I sent an internal message to the Practice Administrator Darrell Palmer about the situation.

## 2018-02-28 NOTE — TELEPHONE ENCOUNTER
Patient called to office and very upset and asking why her appointment was canceled. Patient states she wants to speak with Practice Manager. Kasey Head out of office, call taken by Zenon.

## 2018-05-29 ENCOUNTER — TELEPHONE (OUTPATIENT)
Dept: FAMILY MEDICINE CLINIC | Age: 63
End: 2018-05-29

## 2018-05-29 NOTE — TELEPHONE ENCOUNTER
Patient called to say the linisinopril medication is making her cough and also urinating a lot more than normal.  Some shortness of breath. Said the doctor increased it from 5 mg to 10 mg. To let you know that she stopped taking it two weeks ago. Please advise.    [phone]

## 2018-05-30 ENCOUNTER — TELEPHONE (OUTPATIENT)
Dept: FAMILY MEDICINE CLINIC | Age: 63
End: 2018-05-30

## 2018-05-30 NOTE — TELEPHONE ENCOUNTER
Call transferred from [facility]. Patient called to speak only with her doctor about her medication. She was informed that Dr. Deacon Ramon is not in the office and she wanted to make sure he knows she called. Please review encounter of yesterday, 5/29. Said that she also has not had 11 no show appts at this office as she is now on \"walk in status\".

## 2019-03-05 ENCOUNTER — TELEPHONE (OUTPATIENT)
Dept: FAMILY MEDICINE CLINIC | Age: 64
End: 2019-03-05

## 2019-03-05 NOTE — TELEPHONE ENCOUNTER
[phone]  Patient called to say the pharmacy contacted this office several days ago for a refill on the blood pressure and synthroid medication. She was informed their request was not received. Said the pharmacy gave her an emergency supply. She was informed an appointment was required as has not been seen here by a physician in the past year. Also patient was informed she would need to come into the office to check for an available opening.

## 2019-03-09 ENCOUNTER — OFFICE VISIT (OUTPATIENT)
Dept: FAMILY MEDICINE CLINIC | Age: 64
End: 2019-03-09

## 2019-03-09 VITALS
DIASTOLIC BLOOD PRESSURE: 96 MMHG | OXYGEN SATURATION: 17 % | SYSTOLIC BLOOD PRESSURE: 157 MMHG | BODY MASS INDEX: 21.62 KG/M2 | RESPIRATION RATE: 16 BRPM | HEIGHT: 69 IN | HEART RATE: 85 BPM | WEIGHT: 146 LBS | TEMPERATURE: 98.7 F

## 2019-03-09 DIAGNOSIS — I10 HYPERTENSION, UNSPECIFIED TYPE: Primary | ICD-10-CM

## 2019-03-09 DIAGNOSIS — E03.4 HYPOTHYROIDISM DUE TO ACQUIRED ATROPHY OF THYROID: ICD-10-CM

## 2019-03-09 RX ORDER — LEVOTHYROXINE SODIUM 150 UG/1
TABLET ORAL
Qty: 30 TAB | Refills: 0 | Status: SHIPPED | OUTPATIENT
Start: 2019-03-09 | End: 2019-04-01 | Stop reason: SDUPTHER

## 2019-03-09 RX ORDER — LISINOPRIL 10 MG/1
10 TABLET ORAL DAILY
Qty: 30 TAB | Refills: 0 | Status: SHIPPED | OUTPATIENT
Start: 2019-03-09 | End: 2019-03-14 | Stop reason: SDUPTHER

## 2019-03-10 NOTE — PROGRESS NOTES
Chief Complaint   Patient presents with    Medication Refill     1. Have you been to the ER, urgent care clinic since your last visit? Hospitalized since your last visit? No    2. Have you seen or consulted any other health care providers outside of the [address] [place] [address] since your last visit? Include any pap smears or colon screening.  No      NEEDS REFILL SYNTHROID AND LISINOPRIL
HISTORY OF PRESENT ILLNESS  Rachel Marquez is a 71 y.o. female. HPI   This 71year old lady presents to request refills on her medication  She is asymptomatic  No concerns, except she noticed her BP has been high lately    Review of Systems   Constitutional: Negative for chills and fever. Respiratory: Negative for shortness of breath. Cardiovascular: Negative for chest pain and palpitations. Gastrointestinal: Negative for nausea. Physical Exam   Constitutional: She appears well-developed and well-nourished. No distress. Cardiovascular: Normal rate, regular rhythm and normal heart sounds. Pulmonary/Chest: Effort normal and breath sounds normal. No respiratory distress. She has no wheezes. Skin: She is not diaphoretic. ASSESSMENT and PLAN    ICD-10-CM ICD-9-CM    1. Hypertension, unspecified type I10 401.9    2. Hypothyroidism due to acquired atrophy of thyroid E03.4 244.8 levothyroxine (SYNTHROID) 150 mcg tablet     246.8    medications refilled.   She is in need of a new PCP  She will follow up next week, at which time she would have all blood work done including tsh check  Precautions given
Baseline dementia , no focal deficits, no motor or sensory deficits.

## 2019-03-11 DIAGNOSIS — E03.4 HYPOTHYROIDISM DUE TO ACQUIRED ATROPHY OF THYROID: ICD-10-CM

## 2019-03-11 RX ORDER — LEVOTHYROXINE SODIUM 150 UG/1
TABLET ORAL
Qty: 30 TAB | Refills: 0 | OUTPATIENT
Start: 2019-03-11

## 2019-03-14 NOTE — TELEPHONE ENCOUNTER
Patient has upcomming apt with Dr. Tennille Tejada 04/12/19, pt is requesting refill to last her until this apt if possible, please advise, thank you!

## 2019-03-15 RX ORDER — LISINOPRIL 10 MG/1
10 TABLET ORAL DAILY
Qty: 30 TAB | Refills: 0 | Status: SHIPPED | OUTPATIENT
Start: 2019-03-15 | End: 2019-04-10 | Stop reason: SDUPTHER

## 2019-04-01 DIAGNOSIS — E03.4 HYPOTHYROIDISM DUE TO ACQUIRED ATROPHY OF THYROID: ICD-10-CM

## 2019-04-02 RX ORDER — LEVOTHYROXINE SODIUM 150 UG/1
TABLET ORAL
Qty: 30 TAB | Refills: 0 | Status: SHIPPED | OUTPATIENT
Start: 2019-04-02 | End: 2019-05-10 | Stop reason: SDUPTHER

## 2019-04-05 ENCOUNTER — TELEPHONE (OUTPATIENT)
Dept: FAMILY MEDICINE CLINIC | Age: 64
End: 2019-04-05

## 2019-04-05 DIAGNOSIS — E03.9 HYPOTHYROIDISM, UNSPECIFIED TYPE: ICD-10-CM

## 2019-04-05 DIAGNOSIS — E78.2 MIXED HYPERLIPIDEMIA: ICD-10-CM

## 2019-04-05 DIAGNOSIS — R73.09 ELEVATED HEMOGLOBIN A1C: ICD-10-CM

## 2019-04-05 DIAGNOSIS — E55.9 VITAMIN D DEFICIENCY: ICD-10-CM

## 2019-04-05 DIAGNOSIS — I10 ESSENTIAL HYPERTENSION: Primary | ICD-10-CM

## 2019-04-05 NOTE — TELEPHONE ENCOUNTER
Patient asking that Dr. Nina Cintron please order labs prior to her follow up appt with Dr. Pankaj Diro. Patient states having labs done prior would make more sense to find out what's wrong with her. Patient asking that labs be ordered ASAP as she would like to set up appt for Monday.     [phone]    thanks

## 2019-04-09 NOTE — TELEPHONE ENCOUNTER
This writer contacted patient in reference to lab orders. Two patient identifiers confirmed. Patient informed labs have been order prior to appointment on Friday April 12, 2019 with Dr. Shawn Davila. Patient verbalized understanding and states she will be in the office in the morning for labs. No further questions or concerns voiced.  No further action required at this time

## 2019-04-10 ENCOUNTER — LAB ONLY (OUTPATIENT)
Dept: FAMILY MEDICINE CLINIC | Age: 64
End: 2019-04-10

## 2019-04-10 DIAGNOSIS — M85.80 OSTEOPENIA, UNSPECIFIED LOCATION: ICD-10-CM

## 2019-04-10 DIAGNOSIS — E55.9 VITAMIN D DEFICIENCY: ICD-10-CM

## 2019-04-10 DIAGNOSIS — I10 ESSENTIAL HYPERTENSION: ICD-10-CM

## 2019-04-10 DIAGNOSIS — E03.9 HYPOTHYROIDISM, UNSPECIFIED TYPE: Primary | ICD-10-CM

## 2019-04-10 DIAGNOSIS — R73.09 ELEVATED HEMOGLOBIN A1C: ICD-10-CM

## 2019-04-10 DIAGNOSIS — E78.5 HYPERLIPIDEMIA, UNSPECIFIED HYPERLIPIDEMIA TYPE: ICD-10-CM

## 2019-04-10 RX ORDER — LISINOPRIL 10 MG/1
10 TABLET ORAL DAILY
Qty: 30 TAB | Refills: 0 | Status: SHIPPED | OUTPATIENT
Start: 2019-04-10 | End: 2019-06-23 | Stop reason: SDUPTHER

## 2019-04-10 NOTE — TELEPHONE ENCOUNTER
Patient states that she is out of medication and she needs to take medication before she eats this morning.

## 2019-04-11 LAB
25(OH)D3+25(OH)D2 SERPL-MCNC: 25.5 NG/ML (ref 30–100)
ALBUMIN SERPL-MCNC: 4.8 G/DL (ref 3.6–4.8)
ALBUMIN/GLOB SERPL: 1.8 {RATIO} (ref 1.2–2.2)
ALP SERPL-CCNC: 59 IU/L (ref 39–117)
ALT SERPL-CCNC: 13 IU/L (ref 0–32)
AST SERPL-CCNC: 26 IU/L (ref 0–40)
BILIRUB SERPL-MCNC: 1.2 MG/DL (ref 0–1.2)
BUN SERPL-MCNC: 6 MG/DL (ref 8–27)
BUN/CREAT SERPL: 7 (ref 12–28)
CALCIUM SERPL-MCNC: 9.8 MG/DL (ref 8.7–10.3)
CHLORIDE SERPL-SCNC: 90 MMOL/L (ref 96–106)
CHOLEST SERPL-MCNC: 240 MG/DL (ref 100–199)
CO2 SERPL-SCNC: 24 MMOL/L (ref 20–29)
CREAT SERPL-MCNC: 0.88 MG/DL (ref 0.57–1)
ERYTHROCYTE [DISTWIDTH] IN BLOOD BY AUTOMATED COUNT: 14.1 % (ref 12.3–15.4)
EST. AVERAGE GLUCOSE BLD GHB EST-MCNC: 108 MG/DL
GLOBULIN SER CALC-MCNC: 2.7 G/DL (ref 1.5–4.5)
GLUCOSE SERPL-MCNC: 115 MG/DL (ref 65–99)
HBA1C MFR BLD: 5.4 % (ref 4.8–5.6)
HCT VFR BLD AUTO: 42.7 % (ref 34–46.6)
HDLC SERPL-MCNC: 137 MG/DL
HGB BLD-MCNC: 14.6 G/DL (ref 11.1–15.9)
INTERPRETATION, 910389: NORMAL
LDLC SERPL CALC-MCNC: 91 MG/DL (ref 0–99)
MCH RBC QN AUTO: 31.7 PG (ref 26.6–33)
MCHC RBC AUTO-ENTMCNC: 34.2 G/DL (ref 31.5–35.7)
MCV RBC AUTO: 93 FL (ref 79–97)
PLATELET # BLD AUTO: 301 X10E3/UL (ref 150–379)
POTASSIUM SERPL-SCNC: 4.7 MMOL/L (ref 3.5–5.2)
PROT SERPL-MCNC: 7.5 G/DL (ref 6–8.5)
RBC # BLD AUTO: 4.61 X10E6/UL (ref 3.77–5.28)
SODIUM SERPL-SCNC: 131 MMOL/L (ref 134–144)
TRIGL SERPL-MCNC: 60 MG/DL (ref 0–149)
TSH SERPL DL<=0.005 MIU/L-ACNC: 4.29 UIU/ML (ref 0.45–4.5)
VLDLC SERPL CALC-MCNC: 12 MG/DL (ref 5–40)
WBC # BLD AUTO: 4.7 X10E3/UL (ref 3.4–10.8)

## 2019-04-12 ENCOUNTER — OFFICE VISIT (OUTPATIENT)
Dept: FAMILY MEDICINE CLINIC | Age: 64
End: 2019-04-12

## 2019-04-12 VITALS
HEIGHT: 69 IN | DIASTOLIC BLOOD PRESSURE: 84 MMHG | RESPIRATION RATE: 18 BRPM | OXYGEN SATURATION: 97 % | BODY MASS INDEX: 21.3 KG/M2 | TEMPERATURE: 97.7 F | HEART RATE: 92 BPM | SYSTOLIC BLOOD PRESSURE: 139 MMHG | WEIGHT: 143.8 LBS

## 2019-04-12 DIAGNOSIS — F17.200 SMOKER: ICD-10-CM

## 2019-04-12 DIAGNOSIS — I10 ESSENTIAL HYPERTENSION: ICD-10-CM

## 2019-04-12 DIAGNOSIS — M17.0 PRIMARY OSTEOARTHRITIS OF BOTH KNEES: ICD-10-CM

## 2019-04-12 DIAGNOSIS — R79.89 LOW VITAMIN D LEVEL: ICD-10-CM

## 2019-04-12 DIAGNOSIS — R29.6 FREQUENT FALLS: Primary | ICD-10-CM

## 2019-04-12 DIAGNOSIS — Z12.39 BREAST CANCER SCREENING: ICD-10-CM

## 2019-04-12 DIAGNOSIS — R63.0 DECREASED APPETITE: ICD-10-CM

## 2019-04-12 RX ORDER — ERGOCALCIFEROL 1.25 MG/1
50000 CAPSULE ORAL
Qty: 12 CAP | Refills: 0 | Status: SHIPPED | OUTPATIENT
Start: 2019-04-12 | End: 2020-04-27

## 2019-04-12 NOTE — PROGRESS NOTES
Phil Collado is a 71 y.o. female  Chief Complaint   Patient presents with    Blood Pressure Check    Knee Pain     states has fallen twice in last 6 months    Leg Pain     states has pains in her legs     Visit Vitals  /84 (BP 1 Location: Left arm, BP Patient Position: Sitting)   Pulse 92   Temp 97.7 °F (36.5 °C) (Oral)   Resp 18   Ht 5' 9\" (1.753 m)   Wt 143 lb 12.8 oz (65.2 kg)   SpO2 97%   BMI 21.24 kg/m²     1. Have you been to the ER, urgent care clinic since your last visit? Hospitalized since your last visit? No    2. Have you seen or consulted any other health care providers outside of the [address] [place] [address] since your last visit? Include any pap smears or colon screening.  No  Health Maintenance Due   Topic Date Due    Shingrix Vaccine Age 50> (1 of 2) 01/06/2004    FOBT Q 1 YEAR AGE 50-75  09/28/2017    GLAUCOMA SCREENING Q2Y  01/06/2019    Pneumococcal 65+ years (1 of 2 - PCV13) 01/06/2019

## 2019-04-12 NOTE — PATIENT INSTRUCTIONS
How to Get Up Safely After a Fall: Care Instructions  Your Care Instructions    If you have injuries, health problems, or other reasons that may make it easy for you to fall at home, it is a good idea to learn how to get up safely after a fall. Learning how to get up correctly can help you avoid making an injury worse. Also, knowing what to do if you cannot get up can help you stay safe until help arrives. Follow-up care is a key part of your treatment and safety. Be sure to make and go to all appointments, and call your doctor if you are having problems. It's also a good idea to know your test results and keep a list of the medicines you take. How can you care for yourself after a fall? If you think you can get up  First lie still for a few minutes and think about how you feel. If your body feels okay and you think you can get up safely, follow the rest of the steps below:  1. Look for a chair or other piece of furniture that is close to you. 2. Roll onto your side and rest. Roll by turning your head in the direction you want to roll, move your shoulder and arm, then hip and leg in the same direction. 3. Lie still for a moment to let your blood pressure adjust.  4. Slowly push your upper body up, lift your head, and take a moment to rest.  5. Slowly get up on your hands and knees, and crawl to the chair or other stable piece of furniture. 6. Put your hands on the chair. 7. Move one foot forward, and place it flat on the floor. Your other leg should be bent with the knee on the floor. 8. Rise slowly, turn your body, and sit in the chair. Stay seated for a bit and think about how you feel. Call for help. Even if you feel okay, let someone know what happened to you. You might not know that you have a serious injury. If you cannot get up  1. If you think you are injured after a fall or you cannot get up, try not to panic. 2. Call out for help.   3. If you have a phone within reach or you have an emergency call device, use it to call for help. 4. If you do not have a phone within reach, try to slide yourself toward it. If you cannot get to the phone, try to slide toward a door or window or a place where you think you can be heard. 5. [place] or use an object to make noise so someone might hear you. 6. If you can reach something that you can use for a pillow, place it under your head. Try to stay warm by covering yourself with a blanket or clothing while you wait for help. When should you call for help? Call 911 anytime you think you may need emergency care. For example, call if:    · You passed out (lost consciousness).     · You cannot get up after a fall.     · You have severe pain.    Call your doctor now or seek immediate medical care if:    · You have new or worse pain.     · You are dizzy or lightheaded.     · You hit your head.    Watch closely for changes in your health, and be sure to contact your doctor if:    · You do not get better as expected. Where can you learn more? Go to http://karen-alfredito.info/. Enter R969 in the search box to learn more about \"How to Get Up Safely After a Fall: Care Instructions. \"  Current as of: March 15, 2018  Content Version: 11.9  © 6662-6903 [employer]. Care instructions adapted under license by [employer] (which disclaims liability or warranty for this information). If you have questions about a medical condition or this instruction, always ask your healthcare professional. [address] any warranty or liability for your use of this information.

## 2019-04-12 NOTE — PROGRESS NOTES
History of Present Illness:     Chief Complaint   Patient presents with    Blood Pressure Check    Knee Pain     states has fallen twice in last 6 months    Leg Pain     states has pains in her legs     Pt is a 71y.o. year old female    Presents to clinic for blood pressure check. BP at goal in office today  Pt compliant with Lisinopril    C/o issues with her balance  She has fallen 2x over the past 6 months  Knee gives out sometimes  Legs feel heavy at times  Knows she has left knee arthritis and knee gives out  Denies dizziness or lightheadedness     Does not sleep well at night  Reports panic attacks really bad from time to time    Lost 15 lbs since 2017  She admits that she eats differently  Cut back on bread and fruit  Denies dysphagia, early satiety, nausea, vomiting diarrhea   +Reflux    Past Medical History:   Diagnosis Date    Anxiety     Arthritis     Depression     Dizziness     Falls     HTN (hypertension) 6/25/2010    Hypothyroid 6/25/2010    Thyroid disease     Wrist fracture, left          Current Outpatient Medications on File Prior to Visit   Medication Sig Dispense Refill    lisinopril (PRINIVIL, ZESTRIL) 10 mg tablet Take 1 Tab by mouth daily. 30 Tab 0    levothyroxine (SYNTHROID) 150 mcg tablet TAKE ONE TABLET BY MOUTH DAILY BEFORE BREAKFAST 30 Tab 0    multivitamin with iron (DAILY MULTI-VITAMINS/IRON) tablet Take 1 Tab by mouth daily.  buPROPion XL (WELLBUTRIN XL) 150 mg tablet Take 1 Tab by mouth every morning. 30 Tab 0     No current facility-administered medications on file prior to visit.           Allergies:  No Known Allergies      Review of Systems:  Denies fever, chills, sweats  Denies chest pain, BERGER, palpitations, LE edema  +Knee pain bilaterally      Objective:     Vitals:    04/12/19 1409   BP: 139/84   Pulse: 92   Resp: 18   Temp: 97.7 °F (36.5 °C)   TempSrc: Oral   SpO2: 97%   Weight: 143 lb 12.8 oz (65.2 kg)   Height: 5' 9\" (1.753 m)       Physical Exam:  General appearance - alert, well appearing, and in no distress  Chest - clear to auscultation, no wheezes, rales or rhonchi, symmetric air entry  Heart - normal rate, regular rhythm, normal S1, S2, no murmurs, rubs, clicks or gallops  Musculoskeletal - osteoarthritic changes noted in both knees. Limited extension of left knee in L> R.  +crepitus bilaterally in knees. Recent Labs:  No results found for this or any previous visit (from the past 12 hour(s)). Assessment and Plan:   Pt is a 71y.o. year old female,      ICD-10-CM ICD-9-CM    1. Frequent falls R29.6 V15.88 REFERRAL TO PHYSICAL THERAPY   2. Low vitamin D level R79.89 790.6 ergocalciferol (VITAMIN D2) 50,000 unit capsule   3. Primary osteoarthritis of both knees M17.0 715.16 REFERRAL TO PHYSICAL THERAPY   4. Breast cancer screening Z12.31 V76.10 THIERRY MAMMO BI SCREENING INCL CAD   5. Essential hypertension I10 401.9    6. Smoker F17.200 305.1    7. Decreased appetite R63.0 783.0      Refer to PT    Refilled Vitamin D    Ordered mammogram    Continue current HTN meds  Reviewed medications    Encouraged to quit smoking  Pt interested in quitting but not today    Discussed her decreased appetite  Pt going to pay more attention to provide more details at next visit  Declined GI referral today, which I recommend  She has never had colonoscopy and I suspect she would benefit from EGD as well    Follow up in 4 weeks. Needs 30 min appointment. Patrica Turk MD      I have discussed the diagnosis with the patient and the intended plan as seen in the above orders. The patient has received an after-visit summary and questions were answered concerning future plans.

## 2019-04-25 ENCOUNTER — TELEPHONE (OUTPATIENT)
Dept: FAMILY MEDICINE CLINIC | Age: 64
End: 2019-04-25

## 2019-04-25 NOTE — TELEPHONE ENCOUNTER
Patient would like Dr. Dashawn Holland to call her ASAP. Patient states it's very important and didn't provide any details.     Call [phone]

## 2019-04-25 NOTE — TELEPHONE ENCOUNTER
Luciano Wilson- MD/ telephone   Received:  Today   Message Contents   Read, [address]             Pt would like a call back from the Dr. Velma Mckeon Nurse (pt would not state what this is in reference to) Pts contact [phone]

## 2019-04-25 NOTE — TELEPHONE ENCOUNTER
Patient calling  Back and now stating I really need to talk to Dr. Mikayla Proctor. She states she need to talk to her about her \"Mental Health\". Asked patient to hold . Spoke to clinical Supervisor Heaven GOLDMAN to ask for assistance. Patient hung up.  If she calls back call Ceferino Bell      thanks

## 2019-04-25 NOTE — TELEPHONE ENCOUNTER
Called the Patient back to find out what Is going on. Patient refused to talk to me. She stated she had no problem with me but wanted to speak directly to the Doctor. I told her she was in clinic and I just wanted to get more information from her. Patient stated she needed Physical Help. I advised the patient I would let  Know.

## 2019-04-25 NOTE — TELEPHONE ENCOUNTER
Patient called and speaking very lowly on phone rambling on and on. She mentioned she can't get through to office that she was referred to.  She then asked that I transfer her to [phone]    I called clinical Supervisor/Angie ST and had her listen in on patient after minutes of continued rambling before transfer of call
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
## 2019-05-29 ENCOUNTER — TELEPHONE (OUTPATIENT)
Dept: FAMILY MEDICINE CLINIC | Age: 64
End: 2019-05-29

## 2019-05-29 NOTE — TELEPHONE ENCOUNTER
Patient called the office and provided name and date of birth. She is stating she is unhappy with this office and was also using profanity. At this time she was asked to please not use it anymore and she did again. She was advised at this time, the phone call will be disconnecting which ended.     (mangement aware)

## 2019-05-29 NOTE — TELEPHONE ENCOUNTER
This message was researched by phone number only. [phone]    Received a call starting with \"I want to speak with Dr. Shaheed Draper nurse\". She was informed the nurses were at lunch. I then asked name and date of birth of the patient of which she refused as saying \"you do not need it\". \"I only want to speak with   Dr. Shaheed Draper nurse. Then she hung up the phone.     (Elaina aware of phone call)

## 2019-05-29 NOTE — TELEPHONE ENCOUNTER
This message was again researched by phone number. [phone]      Received phone call and the caller said she did not want to speak with me. She wanted to speak with the other person she was talking to. She then asked to speak with the  and was informed they do not answer the phone at this time. I then asked for name and date of birth of the patient and she said \"I'm not telling you\". She was then informed in order to help her that I needed name. She said again that \"I am not giving it to you\"    She then hung up the phone.

## 2019-05-29 NOTE — TELEPHONE ENCOUNTER
Patient called to office and asked if I was person she was just speaking to and I stated no, but told her I could help. Patient states she called earlier to verify her appt today with Dr. Kiley Pham at 3:00 pm and was told she didn't have appt. I verified there was no appt and she became angry and states she have a paper to show she has appt on today. I told her she can bring the paper to show Management and she states she's not bringing anything and that I was just going to listen to her about the paper with her appt. She started rambling and getting very agitated and I asked her to hold. Attempt contact to Practice Manager/Eloise Murrieta but she was not available at time.  I did mention call to supervisor Erin WHITAKER) also

## 2019-06-20 ENCOUNTER — TELEPHONE (OUTPATIENT)
Dept: FAMILY MEDICINE CLINIC | Age: 64
End: 2019-06-20

## 2019-06-20 NOTE — TELEPHONE ENCOUNTER
Patient has called twice this morning. Rambling and talking about how's she just angry about things that she shouldn't be and just sad. Patient states she's just reaching out for help and wants to go to Rehab and relax. [place] notes she's started drinking again and drinks beer like it's going out of style. Patient mentioned things from her past that has happened to her during conversation. Patient kept saying she didn't know who she was talking to and was mentioning my name often during call. Patient states that she's not suicidal and would never harm herself. Patient was talking about many things and often repeating herself . Patient made the comment that she's been drinking and \"not thinking\". Patient states she wants Dr. Katherine Martines to call her. I did make her aware she wasn't in office until next week and she said to leave her a message. Patient states she called back as she just wanted to talk to me again.

## 2019-06-24 NOTE — TELEPHONE ENCOUNTER
Patient verified by 2 identifiers. Informed patient she have been scheduled for 7/15/19 at 10:15 AM. Patient verbalized understanding.

## 2019-06-24 NOTE — TELEPHONE ENCOUNTER
Returned call to patient. We discussed her frustrations. Still having decreased appetite and weight loss. Says she is now willing to do some of the testing we discussed at her prior visit. Will have her called to re-schedule a visit as she will be out of town on 7/3.

## 2019-07-15 ENCOUNTER — OFFICE VISIT (OUTPATIENT)
Dept: FAMILY MEDICINE CLINIC | Age: 64
End: 2019-07-15

## 2019-07-15 VITALS
DIASTOLIC BLOOD PRESSURE: 83 MMHG | HEIGHT: 69 IN | RESPIRATION RATE: 18 BRPM | OXYGEN SATURATION: 99 % | HEART RATE: 80 BPM | TEMPERATURE: 98.3 F | WEIGHT: 141.6 LBS | BODY MASS INDEX: 20.97 KG/M2 | SYSTOLIC BLOOD PRESSURE: 130 MMHG

## 2019-07-15 DIAGNOSIS — Z12.11 COLON CANCER SCREENING: ICD-10-CM

## 2019-07-15 DIAGNOSIS — Z87.19 HISTORY OF EPIGASTRIC PAIN: ICD-10-CM

## 2019-07-15 DIAGNOSIS — I10 ESSENTIAL HYPERTENSION: ICD-10-CM
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
## 2019-07-15 DIAGNOSIS — F10.10 ALCOHOL ABUSE: ICD-10-CM

## 2019-07-15 DIAGNOSIS — F10.982 ALCOHOL-INDUCED INSOMNIA (HCC): ICD-10-CM

## 2019-07-15 DIAGNOSIS — F17.200 SMOKER: ICD-10-CM

## 2019-07-15 DIAGNOSIS — R63.4 WEIGHT LOSS: Primary | ICD-10-CM

## 2019-07-15 RX ORDER — PHENOL/SODIUM PHENOLATE
20 AEROSOL, SPRAY (ML) MUCOUS MEMBRANE DAILY
Qty: 30 TAB | Refills: 1 | Status: SHIPPED | OUTPATIENT
Start: 2019-07-15 | End: 2020-11-17 | Stop reason: SDUPTHER

## 2019-07-15 NOTE — PATIENT INSTRUCTIONS
Melatonin (By mouth)   Melatonin (karissa-a-TOE-karina)  Treats insomnia. Brand Name(s): Advanced Sleep Melatonin, Basic's Melatonin, Finest Nutrition Melatonin, [facility] Melatonin, Nature's Blend Melatonin, Optimum Melatonin, PharmAssure Melatonin, [employer] Melatonin, [employer] Melatonin   There may be other brand names for this medicine. When This Medicine Should Not Be Used: You should not use this medicine if you have had an allergic reaction to melatonin. How to Use This Medicine:   Capsule, Long Acting Capsule, Liquid, Tablet, Long Acting Tablet  · Your doctor will tell you how much medicine to use. Do not use more than directed. · Follow the instructions on the medicine label if you are using this medicine without a prescription. · Take your dose 20 minutes before your bedtime. You may take this medicine with or without food. · The liquid may be taken directly or combined with water or juice. If a dose is missed:   · If you miss a dose or forget to use your medicine, call your doctor or pharmacist for instructions. How to Store and Dispose of This Medicine:   · Store the medicine in a closed container at room temperature, away from heat, moisture, and direct light. · Keep all medicine out of the reach of children. Never share your medicine with anyone. · Ask your pharmacist, doctor, or health caregiver about the best way to dispose of any outdated medicine or medicine no longer needed. Drugs and Foods to Avoid:   Ask your doctor or pharmacist before using any other medicine, including over-the-counter medicines, vitamins, and herbal products. · Make sure your doctor knows if you are also using any tranquilizer medicines, or if you are also using any sedative medicines. Warnings While Using This Medicine:   · Make sure your doctor knows if you are pregnant or breast feeding, or if you have an autoimmune condition.  Make sure your doctor knows if you are feeling sad or depressed. · This medicine may make you drowsy. Avoid driving, using machines, or doing anything else that might be dangerous if you are not alert. Possible Side Effects While Using This Medicine: If you notice these less serious side effects, talk with your doctor:  · Feeling sluggish or tired in the morning. · Headache. If you notice other side effects that you think are caused by this medicine, tell your doctor. Call your doctor for medical advice about side effects. You may report side effects to FDA at 0-565-UDO-7366  © 2017 [facility] Information is for End User's use only and may not be sold, redistributed or otherwise used for commercial purposes. The above information is an  only. It is not intended as medical advice for individual conditions or treatments. Talk to your doctor, nurse or pharmacist before following any medical regimen to see if it is safe and effective for you.

## 2019-07-15 NOTE — PROGRESS NOTES
History of Present Illness:     Chief Complaint   Patient presents with    Other     weight check     Pt is a 71y.o. year old female    Presents to clinic for weight loss. Lost 15 lbs since 2017  Down 2 more lbs since last visit  She admits that she eats differently  Cut back on bread and fruit  Since last visit, she cannot eat starchy foods, gluten, red meat or spicy food  Denies dysphagia, early satiety, nausea, vomiting diarrhea   +Reflux     Frequent falls  She has fallen 2x over the past 6 months  Saw PT twice  Knee gives out sometimes  Legs feel heavy at times  Knows she has left knee arthritis and knee gives out  Denies dizziness or lightheadedness     EtOH use  Drinks 6-12 beers in one sitting  Drinks 3-4 times per week  Last drink was 5 days ago  Admits to prior use of illicit substances       Past Medical History:   Diagnosis Date    Anxiety     Arthritis     Depression     Dizziness     Falls     HTN (hypertension) 6/25/2010    Hypothyroid 6/25/2010    Thyroid disease     Wrist fracture, left          Current Outpatient Medications on File Prior to Visit   Medication Sig Dispense Refill    lisinopril (PRINIVIL, ZESTRIL) 10 mg tablet TAKE ONE TABLET BY MOUTH DAILY 30 Tab 2    levothyroxine (SYNTHROID) 150 mcg tablet TAKE ONE TABLET BY MOUTH DAILY BEFORE BREAKFAST 90 Tab 1    ergocalciferol (VITAMIN D2) 50,000 unit capsule Take 1 Cap by mouth every month. 12 Cap 0    multivitamin with iron (DAILY MULTI-VITAMINS/IRON) tablet Take 1 Tab by mouth daily.  buPROPion XL (WELLBUTRIN XL) 150 mg tablet Take 1 Tab by mouth every morning. 30 Tab 0     No current facility-administered medications on file prior to visit. Allergies:   Allergies   Allergen Reactions    Penicillins Other (comments)     Yeast infection         Review of Systems:  Denies fever, chills, sweats  Denies chest pain, BERGER, palpitations, LE edema  Denies cough, sputum production, SOB, pleuritic chest pain, wheezing  +Reflux, epigastric discomfort      Objective:     Vitals:    07/15/19 1017   BP: 130/83   Pulse: 80   Resp: 18   Temp: 98.3 °F (36.8 °C)   TempSrc: Oral   SpO2: 99%   Weight: 141 lb 9.6 oz (64.2 kg)   Height: 5' 9\" (1.753 m)       Physical Exam:  General appearance - alert, well appearing, and in no distress and normal appearing weight  Mental status - alert, oriented to person, place, and time. Poor insight into drinking. Chest - clear to auscultation, no wheezes, rales or rhonchi, symmetric air entry  Heart - normal rate, regular rhythm, normal S1, S2, no murmurs, rubs, clicks or gallops  Neurological - alert, oriented, normal speech, no focal findings or movement disorder noted, unsteady gait      Recent Labs:  No results found for this or any previous visit (from the past 12 hour(s)). Assessment and Plan:   Pt is a 71y.o. year old female,      ICD-10-CM ICD-9-CM    1. Weight loss R63.4 783.21 Omeprazole delayed release (PRILOSEC D/R) 20 mg tablet      OCCULT BLOOD IMMUNOASSAY,DIAGNOSTIC   2. History of epigastric pain Z87.19 V12.79 Omeprazole delayed release (PRILOSEC D/R) 20 mg tablet   3. Essential hypertension I10 401.9    4. Fall, subsequent encounter W19. XXXD V58.89      E888.9    5. Smoker F17.200 305.1    6. Colon cancer screening Z12.11 V76.51 OCCULT BLOOD IMMUNOASSAY,DIAGNOSTIC   7.  Alcohol abuse F10.10 305.00      Weight loss  Pt keeping diet diary and avoiding foods that make abdominal discomfort worse  I'm sure her drinking is contributing to nutritional deficiencies  Need to do routine cancer screening (mammo, FIT ordered)  Will trial PPI  If no improvement, GI referral as she may benefit from EGD and colonoscopy; pt refusing currently but agreeable if she fails PPI    HTN  Stable on Lisinopril    Falls  Referred to PT; went 2 times  Pt agreeable to keep working with them    Smoking  Counseled on smoking cessation  She refused trial of Wellbutrin or other meds  Not ready to quit    FIT for colon cancer screening    Discussed her EtOH use  She does not think she drinks too much  We discussed recommendations for women (1 beer daily)  She admits to drinking when she feels depressed or bored  We discussed constructive alternatives (exercise, sewing); she seems agreeable  Can consider Naltrexone     Insomnia  Likely related to EtOH use as well  Counseled on healthy drinking habits  Recommended Melatonin trial    Follow up in 1 month for eval of epigastric pain and weight loss    Brigitte Haddad MD      I have discussed the diagnosis with the patient and the intended plan as seen in the above orders. The patient has received an after-visit summary and questions were answered concerning future plans.

## 2019-07-15 NOTE — PROGRESS NOTES
Chief Complaint   Patient presents with    Other     weight check     1. Have you been to the ER, urgent care clinic since your last visit? Hospitalized since your last visit? No    2. Have you seen or consulted any other health care providers outside of the [address] [place] [address] since your last visit? Include any pap smears or colon screening.  No  Visit Vitals  /83 (BP 1 Location: Right arm, BP Patient Position: Sitting)   Pulse 80   Temp 98.3 °F (36.8 °C) (Oral)   Resp 18   Ht 5' 9\" (1.753 m)   Wt 141 lb 9.6 oz (64.2 kg)   SpO2 99%   BMI 20.91 kg/m²     Health Maintenance Due   Topic Date Due    Shingrix Vaccine Age 50> (1 of 2) 01/06/2004    FOBT Q 1 YEAR AGE 50-75  09/28/2017    GLAUCOMA SCREENING Q2Y  01/06/2019    Pneumococcal 65+ years (1 of 2 - PCV13) 01/06/2019    BREAST CANCER SCRN MAMMOGRAM  09/11/2019

## 2019-07-23 ENCOUNTER — DOCUMENTATION ONLY (OUTPATIENT)
Dept: FAMILY MEDICINE CLINIC | Age: 64
End: 2019-07-23

## 2019-07-23 NOTE — PROGRESS NOTES
This is just documentation regarding this patient. It reflects how many of her telephone encounters that are between this office and herself. She called six times on 06/29/19. The telephone service [employer] has retrieved these calls for our review. These calls are saved from the emails that [employer] sent with the calls attached. Each call was handled by three different staff members at our office on this date. Each time the patient was rambling and would become belligerent. She would use foul language. Each time she would use foul language, she would be asked not to speak in this manner. She would refuse to give her name (identify herself) and date of birth. The staff can now identify this patient by her voice, and can research the patient by her phone number. When asked to identify herself with the two identifiers, she would \"ask what business is of ours to ask who is calling\", \"no, you may not ask who is calling\", would ask to speak to someone else other than who answered the call. She was told multiple times that her provider is not available to speak on the phone at that time, or that her nurse was not available. She called during lunchtime, and would insist to speak with her provider or nurse (who were not available). Since she would not identify herself, verbal messages were only given to the provider and nurse that she had been calling. Please ask this writer to retrieve the call #[phone] wave, and any other calls from this date, to review specifics.

## 2019-07-27 LAB — HEMOCCULT STL QL IA: NEGATIVE

## 2019-08-01 ENCOUNTER — TELEPHONE (OUTPATIENT)
Dept: FAMILY MEDICINE CLINIC | Age: 64
End: 2019-08-01

## 2019-08-01 NOTE — TELEPHONE ENCOUNTER
Verified patient by 2 identifiers. Notified patient per Dr Ashish Joiner of [facility].  Patient verbalized understanding.      ----- Message from Shayy Castañeda MD sent at 7/29/2019  1:26 PM EDT -----  [facility]    Please call and notify patient that her colon cancer screening test was negative

## 2019-10-18 DIAGNOSIS — E03.4 HYPOTHYROIDISM DUE TO ACQUIRED ATROPHY OF THYROID: ICD-10-CM

## 2019-10-22 RX ORDER — LEVOTHYROXINE SODIUM 150 UG/1
TABLET ORAL
Qty: 90 TAB | Refills: 1 | Status: SHIPPED | OUTPATIENT
Start: 2019-10-22 | End: 2019-12-13 | Stop reason: SDUPTHER

## 2019-10-22 RX ORDER — LISINOPRIL 10 MG/1
TABLET ORAL
Qty: 90 TAB | Refills: 1 | Status: SHIPPED | OUTPATIENT
Start: 2019-10-22 | End: 2020-02-03

## 2019-12-05 ENCOUNTER — TELEPHONE (OUTPATIENT)
Dept: FAMILY MEDICINE CLINIC | Age: 64
End: 2019-12-05

## 2019-12-05 NOTE — TELEPHONE ENCOUNTER
Patient asking that Dr. Kristin Rubin please call her. Patient states she need help and wants to go to Rehab. Patient states she need to discuss some things with Dr. Kristin Rubin. Patient started singing she need to go to [facility] Rehab, Rehab\". Patient started talking about the kind of beer she liked drinking. Patient states she feels angry. She kept saying she's tired of things. She states she's not suicidal at all and would not hurt anyone. She was going on and on rambling during call and asked if I was listening to her.  She thanked me for listening and ask that I have Dr. Kristin Rubin call her ASAP.        [phone]

## 2019-12-08 NOTE — TELEPHONE ENCOUNTER
Verified patient by 2 identifiers. Patient is stating she feels she needs to go back to rehab for help. She felt she could work it out on her own but as she see she needs counseling and help with focusing on her depression, drinking, PTSD. She states she is not out to harm herself or nobody but she know she is at a point where she needs help. Patient was scheduled an appointment for 12/16/18 at 3:45 PM with Dr Jaziel Nelson due to she not wanting to seeanother provider and Dr Jaziel Nelson have no openings. Patient verbalized agreement to time and date.

## 2019-12-13 DIAGNOSIS — E03.4 HYPOTHYROIDISM DUE TO ACQUIRED ATROPHY OF THYROID: ICD-10-CM

## 2019-12-16 RX ORDER — LEVOTHYROXINE SODIUM 150 UG/1
TABLET ORAL
Qty: 96 TAB | Refills: 0 | Status: SHIPPED | OUTPATIENT
Start: 2019-12-16 | End: 2020-03-13

## 2019-12-26 ENCOUNTER — TELEPHONE (OUTPATIENT)
Dept: FAMILY MEDICINE CLINIC | Age: 64
End: 2019-12-26

## 2019-12-26 NOTE — TELEPHONE ENCOUNTER
Patient asking that Dr. Mer Baeza nurse call her ASAP this morning .  Patient states she have appt this evening and doesn't feel like coming and noted that what's going on with her is mental and not physical.        Per pt call [phone]

## 2020-03-13 DIAGNOSIS — E03.4 HYPOTHYROIDISM DUE TO ACQUIRED ATROPHY OF THYROID: ICD-10-CM

## 2020-03-13 RX ORDER — LEVOTHYROXINE SODIUM 150 UG/1
TABLET ORAL
Qty: 96 TAB | Refills: 0 | Status: SHIPPED | OUTPATIENT
Start: 2020-03-13 | End: 2020-07-06

## 2020-04-25 DIAGNOSIS — R79.89 LOW VITAMIN D LEVEL: ICD-10-CM

## 2020-04-27 RX ORDER — ERGOCALCIFEROL 1.25 MG/1
CAPSULE ORAL
Qty: 12 CAP | Refills: 0 | Status: SHIPPED | OUTPATIENT
Start: 2020-04-27 | End: 2021-11-19

## 2020-07-04 DIAGNOSIS — E03.4 HYPOTHYROIDISM DUE TO ACQUIRED ATROPHY OF THYROID: ICD-10-CM

## 2020-07-06 RX ORDER — LEVOTHYROXINE SODIUM 150 UG/1
TABLET ORAL
Qty: 96 TAB | Refills: 0 | Status: SHIPPED | OUTPATIENT
Start: 2020-07-06 | End: 2020-10-26

## 2020-10-26 DIAGNOSIS — E03.4 HYPOTHYROIDISM DUE TO ACQUIRED ATROPHY OF THYROID: ICD-10-CM

## 2020-10-26 RX ORDER — LEVOTHYROXINE SODIUM 150 UG/1
TABLET ORAL
Qty: 96 TAB | Refills: 0 | Status: SHIPPED | OUTPATIENT
Start: 2020-10-26 | End: 2020-11-17 | Stop reason: SDUPTHER

## 2020-10-26 NOTE — TELEPHONE ENCOUNTER
LOV > 1 year ago.  Will approve current request but office visit will be needed for additional refills

## 2020-10-30 ENCOUNTER — TELEPHONE (OUTPATIENT)
Dept: FAMILY MEDICINE CLINIC | Age: 65
End: 2020-10-30

## 2020-10-30 NOTE — TELEPHONE ENCOUNTER
Review previous request of 10/26 of Dr. Dulce Marina,   I left message for patient to schedule VV appt.

## 2020-10-30 NOTE — TELEPHONE ENCOUNTER
----- Message from Estephanie Mahmood sent at 10/30/2020 12:39 PM EDT -----  Regarding: Suzy/telephone  Pt is requesting for you to call her to discuss a few issues. Pts number is [phone] or home  cell [phone].

## 2020-11-02 ENCOUNTER — TELEPHONE (OUTPATIENT)
Dept: FAMILY MEDICINE CLINIC | Age: 65
End: 2020-11-02

## 2020-11-04 ENCOUNTER — TELEPHONE (OUTPATIENT)
Dept: FAMILY MEDICINE CLINIC | Age: 65
End: 2020-11-04

## 2020-11-04 NOTE — TELEPHONE ENCOUNTER
Need to speak with you about her health. ... Left message at the beginning of the week with no return call. .... Please call. ..

## 2020-11-10 ENCOUNTER — TELEPHONE (OUTPATIENT)
Dept: FAMILY MEDICINE CLINIC | Age: 65
End: 2020-11-10

## 2020-11-10 DIAGNOSIS — K11.21 ACUTE PAROTITIS: Primary | ICD-10-CM

## 2020-11-10 NOTE — TELEPHONE ENCOUNTER
Returned call to patient. ID confirmed x 2. Discussed recent ED visit for neck/ face swelling. Diagnosed with acute parotitis but she refused hospital observation as recommended. She was discharged on antibiotics (Augmentin) and told to follow up with ENT and PCP. Will help arrange ENT follow up for this week. Given strict ED precautions, especially if ANY concern for increased swelling or difficulty breathing. Follow up in office to be arranged for next week, sooner if any issues getting in with ENT asap.      Kinza Melton MD

## 2020-11-10 NOTE — TELEPHONE ENCOUNTER
[facility] ENT notified appt scheduled for November 13 arrive at 26 am . Pt notified  and request information to  be emailed to [email or web address] ([employer]: [place]). com  [facility]  [address] [facility] [address] , [address] [facility]  [phone]

## 2020-11-13 ENCOUNTER — TELEPHONE (OUTPATIENT)
Dept: FAMILY MEDICINE CLINIC | Age: 65
End: 2020-11-13

## 2020-11-13 NOTE — TELEPHONE ENCOUNTER
[address] [place] [address], will need to move appt of 11/17/20 to afternoon.  If patient calls,please route her to me ( Sharon/NORA      Thank you

## 2020-11-17 ENCOUNTER — OFFICE VISIT (OUTPATIENT)
Dept: FAMILY MEDICINE CLINIC | Age: 65
End: 2020-11-17
Payer: COMMERCIAL

## 2020-11-17 ENCOUNTER — TELEPHONE (OUTPATIENT)
Dept: FAMILY MEDICINE CLINIC | Age: 65
End: 2020-11-17

## 2020-11-17 VITALS
SYSTOLIC BLOOD PRESSURE: 175 MMHG | OXYGEN SATURATION: 98 % | RESPIRATION RATE: 16 BRPM | HEART RATE: 76 BPM | TEMPERATURE: 96.4 F | BODY MASS INDEX: 22.21 KG/M2 | DIASTOLIC BLOOD PRESSURE: 96 MMHG | WEIGHT: 150.38 LBS

## 2020-11-17 DIAGNOSIS — Z87.19 HISTORY OF EPIGASTRIC PAIN: ICD-10-CM

## 2020-11-17 DIAGNOSIS — R73.09 ELEVATED HEMOGLOBIN A1C: ICD-10-CM

## 2020-11-17 DIAGNOSIS — I10 ESSENTIAL HYPERTENSION: ICD-10-CM

## 2020-11-17 DIAGNOSIS — E78.5 HYPERLIPIDEMIA, UNSPECIFIED HYPERLIPIDEMIA TYPE: ICD-10-CM

## 2020-11-17 DIAGNOSIS — K11.21 ACUTE PAROTITIS: Primary | ICD-10-CM

## 2020-11-17 DIAGNOSIS — R63.4 WEIGHT LOSS: ICD-10-CM

## 2020-11-17 DIAGNOSIS — F43.21 ADJUSTMENT DISORDER WITH DEPRESSED MOOD: ICD-10-CM

## 2020-11-17 DIAGNOSIS — Z12.31 ENCOUNTER FOR SCREENING MAMMOGRAM FOR MALIGNANT NEOPLASM OF BREAST: ICD-10-CM

## 2020-11-17 DIAGNOSIS — R26.9 GAIT DIFFICULTY: ICD-10-CM
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
## 2020-11-17 DIAGNOSIS — Z12.11 COLON CANCER SCREENING: ICD-10-CM

## 2020-11-17 DIAGNOSIS — E03.4 HYPOTHYROIDISM DUE TO ACQUIRED ATROPHY OF THYROID: ICD-10-CM

## 2020-11-17 DIAGNOSIS — E55.9 VITAMIN D DEFICIENCY: ICD-10-CM

## 2020-11-17 LAB
25(OH)D3 SERPL-MCNC: 28.2 NG/ML (ref 30–100)
ALBUMIN SERPL-MCNC: 4.1 G/DL (ref 3.5–5)
ALBUMIN/GLOB SERPL: 1.3 {RATIO} (ref 1.1–2.2)
ALP SERPL-CCNC: 51 U/L (ref 45–117)
ALT SERPL-CCNC: 14 U/L (ref 12–78)
ANION GAP SERPL CALC-SCNC: 3 MMOL/L (ref 5–15)
AST SERPL-CCNC: 14 U/L (ref 15–37)
BILIRUB SERPL-MCNC: 0.9 MG/DL (ref 0.2–1)
BUN SERPL-MCNC: 9 MG/DL (ref 6–20)
BUN/CREAT SERPL: 11 (ref 12–20)
CALCIUM SERPL-MCNC: 9.5 MG/DL (ref 8.5–10.1)
CHLORIDE SERPL-SCNC: 100 MMOL/L (ref 97–108)
CHOLEST SERPL-MCNC: 209 MG/DL
CO2 SERPL-SCNC: 31 MMOL/L (ref 21–32)
CREAT SERPL-MCNC: 0.8 MG/DL (ref 0.55–1.02)
ERYTHROCYTE [DISTWIDTH] IN BLOOD BY AUTOMATED COUNT: 14 % (ref 11.5–14.5)
EST. AVERAGE GLUCOSE BLD GHB EST-MCNC: 108 MG/DL
GLOBULIN SER CALC-MCNC: 3.2 G/DL (ref 2–4)
GLUCOSE SERPL-MCNC: 87 MG/DL (ref 65–100)
HBA1C MFR BLD: 5.4 % (ref 4–5.6)
HCT VFR BLD AUTO: 40.1 % (ref 35–47)
HDLC SERPL-MCNC: 117 MG/DL
HDLC SERPL: 1.8 {RATIO} (ref 0–5)
HGB BLD-MCNC: 13.2 G/DL (ref 11.5–16)
LDLC SERPL CALC-MCNC: 80.2 MG/DL (ref 0–100)
LIPID PROFILE,FLP: ABNORMAL
MCH RBC QN AUTO: 32 PG (ref 26–34)
MCHC RBC AUTO-ENTMCNC: 32.9 G/DL (ref 30–36.5)
MCV RBC AUTO: 97.1 FL (ref 80–99)
NRBC # BLD: 0 K/UL (ref 0–0.01)
NRBC BLD-RTO: 0 PER 100 WBC
PLATELET # BLD AUTO: 250 K/UL (ref 150–400)
PMV BLD AUTO: 10.7 FL (ref 8.9–12.9)
POTASSIUM SERPL-SCNC: 4 MMOL/L (ref 3.5–5.1)
PROT SERPL-MCNC: 7.3 G/DL (ref 6.4–8.2)
RBC # BLD AUTO: 4.13 M/UL (ref 3.8–5.2)
SODIUM SERPL-SCNC: 134 MMOL/L (ref 136–145)
TRIGL SERPL-MCNC: 59 MG/DL (ref ?–150)
VLDLC SERPL CALC-MCNC: 11.8 MG/DL
WBC # BLD AUTO: 3.6 K/UL (ref 3.6–11)

## 2020-11-17 PROCEDURE — 99214 OFFICE O/P EST MOD 30 MIN: CPT | Performed by: FAMILY MEDICINE

## 2020-11-17 RX ORDER — LISINOPRIL 10 MG/1
TABLET ORAL
Qty: 90 TAB | Refills: 3 | Status: SHIPPED | OUTPATIENT
Start: 2020-11-17 | End: 2021-01-14

## 2020-11-17 RX ORDER — PHENOL/SODIUM PHENOLATE
20 AEROSOL, SPRAY (ML) MUCOUS MEMBRANE DAILY
Qty: 30 TAB | Refills: 1 | Status: SHIPPED | OUTPATIENT
Start: 2020-11-17 | End: 2022-10-24

## 2020-11-17 RX ORDER — LEVOTHYROXINE SODIUM 150 MCG
TABLET ORAL
Qty: 90 TAB | Refills: 3 | Status: SHIPPED | OUTPATIENT
Start: 2020-11-17 | End: 2021-05-03

## 2020-11-17 RX ORDER — BUPROPION HYDROCHLORIDE 150 MG/1
150 TABLET ORAL
Qty: 90 TAB | Refills: 1 | Status: SHIPPED | OUTPATIENT
Start: 2020-11-17 | End: 2022-10-24

## 2020-11-17 NOTE — TELEPHONE ENCOUNTER
Katja with [facility] calling, note that order was placed by doctor for home care. They are unable to except patient at this time due to not being able to get to patient in a timely manner.     Questions call [phone]      thanks

## 2020-11-17 NOTE — PROGRESS NOTES
Chief Complaint   Patient presents with    Physical     Patient presents complaining of unsteady gait; also would like a routine physical.       Vitals:    11/17/20 1119   BP: (!) 164/98   BP 1 Location: Left arm   BP Patient Position: Sitting   Pulse: 77   Resp: 16   Temp: (!) 96.4 °F (35.8 °C)   TempSrc: Temporal   SpO2: 98%   Weight: 150 lb 6 oz (68.2 kg)       1. Have you been to the ER, urgent care clinic since your last visit? Hospitalized since your last visit? No     2. Have you seen or consulted any other health care providers outside of the [address] [place] [address] since your last visit?   No

## 2020-11-17 NOTE — PATIENT INSTRUCTIONS
Home Blood Pressure Test: About This Test  What is it? A home blood pressure test allows you to keep track of your blood pressure at home. Blood pressure is a measure of the force of blood against the walls of your arteries. Blood pressure readings include two numbers, such as 130/80 (say \"130 over 80\"). The first number is the systolic pressure. The second number is the diastolic pressure. Why is this test done? You may do this test at home to:  · Find out if you have high blood pressure. · Track your blood pressure if you have high blood pressure. · Track how well medicine is working to reduce high blood pressure. · Check how lifestyle changes, such as weight loss and exercise, are affecting blood pressure. How do you prepare for the test?  For at least 30 minutes before you take your blood pressure, don't exercise or use caffeine, tobacco, or medicines that raise blood pressure. Take your blood pressure while you feel comfortable and relaxed. Sit quietly with both feet on the floor for at least 5 minutes before the test.  How is the test done? · Sit with your arm slightly bent and resting on a table so that your upper arm is at the same level as your heart. · Roll up your sleeve or take off your shirt to expose your upper arm. · Wrap the blood pressure cuff around your upper arm so that the lower edge of the cuff is about 1 inch above the bend of your elbow. Proceed with the following steps depending on if you are using an automatic or manual pressure monitor. Automatic blood pressure monitors  · Press the on/off button on the automatic monitor and wait until the ready-to-measure \"heart\" symbol appears next to zero in the display window. · Press the start button. The cuff will inflate and deflate by itself. · Your blood pressure numbers will appear on the screen. · Write your numbers in your log book, along with the date and time.   Manual blood pressure monitors  · Place the earpieces of a stethoscope in your ears, and place the bell of the stethoscope over the artery, just below the cuff. · Close the valve on the rubber inflating bulb. · Squeeze the bulb rapidly with your opposite hand to inflate the cuff until the dial or column of mercury reads about 30 mm Hg higher than your usual systolic pressure. If you do not know your usual pressure, inflate the cuff to 210 mm Hg or until the pulse at your wrist disappears. · Open the pressure valve just slightly by twisting or pressing the valve on the bulb. · As you watch the pressure slowly fall, note the level on the dial at which you first start to hear a pulsing or tapping sound through the stethoscope. This is your systolic blood pressure. · Continue letting the air out slowly. The sounds will become muffled and will finally disappear. Note the pressure when the sounds completely disappear. This is your diastolic blood pressure. Let out all the remaining air. · Write your numbers in your log book, along with the date and time. Follow-up care is a key part of your treatment and safety. Be sure to make and go to all appointments, and call your doctor if you are having problems. It's also a good idea to keep a list of the medicines you take. Where can you learn more? Go to http://www.[employer].com/  Enter C427 in the search box to learn more about \"Home Blood Pressure Test: About This Test.\"  Current as of: December 16, 2019               Content Version: 12.6  © 8724-3420 Healthwise, Incorporated. Care instructions adapted under license by [employer] (which disclaims liability or warranty for this information). If you have questions about a medical condition or this instruction, always ask your healthcare professional. [place] [address] any warranty or liability for your use of this information.

## 2020-11-17 NOTE — PROGRESS NOTES
History of Present Illness:     Chief Complaint   Patient presents with    Physical     Patient presents complaining of unsteady gait; also would like a routine physical.       Ruy Farnsworth is a 76 y.o. female     Unsteady gait. Known history of falls and EtOH abuse. Says she hasn't drank \"in a while\". Hypothyroidism. Doing well but did notice a significant difference in how she felt when given the generic Levothyroxine. Parotitis. Evaluated in ED last week. Discharged on Augmentin. We arranged a visit with ENT last week, pt missed her appointment. Reports significant improvement in swelling and pain since being on the antibiotic. C/o digestive problems. Feels like food sits in her belly. Denies pain, difficulty swallowing, blood in stools. Frequent falls. Reports she feels unsteady on her feet. Difficulty sleeping. Says she has PTSD (states previously diagnosed). Decreased sleep, anxiety, poor concentration. Previously prescribed Wellbutrin but says it didn't work. HTN. Says she has only been taking a half pill instead of a whole pill as prescribed. Worried about yellowing of her eyes. Wants her liver numbers checked. PMH (REVIEWED):  Past Medical History:   Diagnosis Date    Anxiety     Arthritis     Depression     Dizziness     Falls     HTN (hypertension) 6/25/2010    Hypothyroid 6/25/2010    Thyroid disease     Wrist fracture, left        Current Medications/Allergies (REVIEWED):     Current Outpatient Medications on File Prior to Visit   Medication Sig Dispense Refill    levothyroxine (Synthroid) 150 mcg tablet TAKE 1 TABLET BY MOUTH EVERY MORNING BEFORE BREAKFAST 96 Tab 0    Vitamin D2 1,250 mcg (50,000 unit) capsule TAKE ONE CAPSULE BY MOUTH EVERY MONTH 12 Cap 0    lisinopril (PRINIVIL, ZESTRIL) 10 mg tablet TAKE 1 TABLET BY MOUTH EVERY DAY 90 Tab 1    multivitamin with iron (DAILY MULTI-VITAMINS/IRON) tablet Take 1 Tab by mouth daily.         Omeprazole delayed release (PRILOSEC D/R) 20 mg tablet Take 1 Tab by mouth daily. 30 Tab 1     No current facility-administered medications on file prior to visit. Allergies   Allergen Reactions    Penicillins Other (comments)     Yeast infection         Review of Systems:     Denies fever, chills, sweats  Denies chest pain, BERGER, palpitations, LE edema  Denies cough, sputum production, SOB, pleuritic chest pain, wheezing  Denies n/v/d, constipation, melena, blood in stool  +Numbness in feet, difficulty walking      Objective:     Vitals:    11/17/20 1119   BP: (!) 164/98   Pulse: 77   Resp: 16   Temp: (!) 96.4 °F (35.8 °C)   TempSrc: Temporal   SpO2: 98%   Weight: 150 lb 6 oz (68.2 kg)       Physical Exam:  General appearance - alert, well appearing, and in no distress  Mental status - alert, oriented to person, place, and time. Hyper-verbal, tangential.   Eyes - sclera anicteric  Chest - clear to auscultation, no wheezes, rales or rhonchi, symmetric air entry  Heart - normal rate, regular rhythm, normal S1, S2, no murmurs, rubs, clicks or gallops  Abdomen - soft, nontender, nondistended, no masses or organomegaly  Neurological - Unsteady gait and difficulty with cerebellar testing (walking heel to toe, +Rhomberg test). 4/5 bilateral LE strength. Extremities - feet normal, good pulses, normal color, temperature and sensation, monofilament sensory exam is normal in both feet    Recent Labs:  No results found for this or any previous visit (from the past 12 hour(s)). Assessment and Plan:       ICD-10-CM ICD-9-CM    1. Acute parotitis  K11.21 527.2    2. Essential hypertension  I10 401.9    3. Elevated hemoglobin A1c  R73.09 790.29    4. Hyperlipidemia, unspecified hyperlipidemia type  E78.5 272.4    5. Hypothyroidism due to acquired atrophy of thyroid  E03.4 244.8      246.8    6. Colon cancer screening  Z12.11 V76.51    7. Encounter for screening mammogram for malignant neoplasm of breast  Z12.31 V76.12    8. Adjustment disorder with depressed mood  F43.21 309.0     PHQ9 = 12. try WELLBTRIN  mg. RTC 2 weeks         Parotitis, improving  Continue abx as prescribed  Made arrangements for ENT visit but pt missed appointment  She has info to re-schedule; recommended she follow up with them    HTN, poorly controlled  Not compliant with med as prescribed  Recommended resuming Lisinopril  Ordered BP cuff  Checking labs    Checking labs for HLD, pre-DM, hypothyroid    FIT for colon cancer screening  Given concerns for digestive issues, recommended GI eval for EGD and colonoscopy  Pt declines. Will trial PPI first and refer to GI if no improvement    Mammogram ordered  Pt declined all vaccines    Resume Wellbutrin for mood    Unsteady gait and falls, ongoing issue  Pt has hx of EtOH abuse  I suspect she is not being forthcoming about her drinking as she has been known to call the office belligerent with concerns for being intoxicated    Will order [facility] PT/OT/ nursing to assess  Consider Neurology evaluation    Attempted to address her numerous concerns at this visit. However, she really needs better and more regular follow up. Pt has a hx of non compliance and poor follow up. Follow up: 2 weeks. Can be phone call or virtual.     Angie Teran MD    We discussed the expected course, resolution and complications of the diagnosis(es) in detail. Medication risks, benefits, costs, interactions, and alternatives were discussed as indicated. I advised her to contact the office if her condition worsens, changes or fails to improve as anticipated. She expressed understanding with the diagnosis(es) and plan.

## 2020-11-17 NOTE — TELEPHONE ENCOUNTER
Curtis I corrected and [facility] [place] [facility] and already sent it over to [facility] for scheduling!    :)

## 2020-11-20 ENCOUNTER — TELEPHONE (OUTPATIENT)
Dept: FAMILY MEDICINE CLINIC | Age: 65
End: 2020-11-20

## 2020-11-20 NOTE — TELEPHONE ENCOUNTER
Attempted to call patient to discuss results. If she returns call, will have nursing convey the following:    - Your vitamin D levels are better so continue taking a daily supplement. - Your cholesterol is elevated which increases your risk for heart attack or stroke to be quite high. We recommend starting a cholesterol lowering medication to help reduce this risk. - Otherwise, your blood counts, liver function and kidney function are normal. There is also no abnormality suggestive of jaundice; I know that was one of your concerns. Please notify me if pt has additional questions or concerns and I will attempt a call back. Or she can be scheduled as a VV for a telephone encounter.      Juanis Rodney MD

## 2020-11-20 NOTE — PROGRESS NOTES
Vitamin D improved, continue daily supplementation  High ASCVD 10 yr risk at > 30%; will recommend starting statin  Remaining labs good  Will call with results

## 2020-11-20 NOTE — LETTER
11/25/2020 3:25 PM 
 
Ms. Asia Flores [address] [id number] Dear Ms. Billings: 
 
 
 
Component  Value  Flag  Ref Range  Units  Status WBC  3.6   3.6 - 11.0  K/uL  Final   
RBC  4.13   3.80 - 5.20  M/uL  Final   
HGB  13.2   11.5 - 16.0  g/dL  Final   
HCT  40.1   35.0 - 47.0  %  Final   
MCV  97.1   80.0 - 99.0  FL  Final   
MCH  32.0   26.0 - 34.0  PG  Final   
MCHC  32.9   30.0 - 36.5  g/dL  Final   
RDW  14.0   11.5 - 14.5  %  Final   
PLATELET  067   181 - 400  K/uL  Final   
MPV  10.7   8.9 - 12.9  FL  Final   
NRBC  0.0   0   WBC  Final   
ABSOLUTE NRBC  0.00   0.00 - 0.01  K/uL  Final   
 
Component  Value  Flag  Ref Range  Units  Status LIPID PROFILE            Final   
Cholesterol, total  209  High    <200  MG/DL  Final   
Triglyceride  59   <150  MG/DL  Final   
Comment:   
Based on NCEP-ATP III:  Triglycerides <150 mg/dL  is considered normal, 150-199  
mg/dL  borderline high,  200-499 mg/dL high and  greater than or equal to 500  
mg/dL very high. HDL Cholesterol  117    MG/DL  Final   
Comment:   
Based on NCEP ATP III, HDL Cholesterol <40 mg/dL is considered low and >60  
mg/dL is elevated. LDL, calculated  80.2   0 - 100  MG/DL  Final   
Comment:   
Based on the NCEP-ATP: LDL-C concentrations are considered  optimal <100 mg/dL,  
near optimal/above Normal 100-129 mg/dL Borderline High: 130-159, High: 160-189  
mg/dL Very High: Greater than or equal to 190 mg/dL VLDL, calculated  11.8    MG/DL  Final   
CHOL/HDL Ratio  1.8   0.0 - 5.0     Final   
 
 
 
 
Component  Value  Flag  Ref Range  Units  Status Hemoglobin A1c  5.4   4.0 - 5.6  %  Final   
Comment: NEW METHOD PLEASE NOTE NEW REFERENCE RANGE  
(NOTE) HbA1C Interpretive Ranges <5.7              Normal  
5.7 - 6.4         Consider Prediabetes >6.5              Consider Diabetes Est. average glucose  108    mg/dL  Final   
 
Component  Value  Flag  Ref Range  Units  Status Vitamin D 25-Hydroxy  28.2  Low    30 - 100  ng/mL  Final   
 
 
 
- Your vitamin D levels are better so continue taking a daily supplement.  
  
- Your cholesterol is elevated which increases your risk for heart attack or stroke to be quite high.  We recommend starting a cholesterol lowering medication to help reduce this risk.  
  
- Otherwise, your blood counts, liver function and kidney function are normal. There is also no abnormality suggestive of jaundice; I know that was one of your concerns.  
  
 
 
Sincerely, 
 
 
Jairon Greenwood MD

## 2020-11-22 NOTE — TELEPHONE ENCOUNTER
Previous notation states for nursing to convey results to patient, and doctor sent message to Loi Ruff:    Dr. Nabeel Phoenix telephone   Received: 2 days ago   Message Contents   Jennifer Caputo, [address] [facility] [facility] [address] Office               Patient return call     Caller's first and last name and relationship (if not the patient): Pt       Best contact number(s): [phone]       Whose call is being returned: n/a       Details to clarify the request: mammogram

## 2020-11-23 ENCOUNTER — TELEPHONE (OUTPATIENT)
Dept: FAMILY MEDICINE CLINIC | Age: 65
End: 2020-11-23

## 2020-12-10 ENCOUNTER — TELEPHONE (OUTPATIENT)
Dept: FAMILY MEDICINE CLINIC | Age: 65
End: 2020-12-10

## 2020-12-10 NOTE — TELEPHONE ENCOUNTER
----- Message from Gume Castanon sent at 12/8/2020  1:49 PM EST -----  Regarding: MD Suzy/Telephone  General Message/Vendor Calls    Caller's first and last name: Brynn with Home Health. Reason for call: Pt not allowing Home Health to see her. Callback required yes/no and why: No      Best contact number(s): [phone]      Details to clarify the request: Nurse with [facility] letting Dr Luis Harkins know that the pt has not yet let any nurses see her. When they last spoke the pt said \"maybe later this week\" but is now not returning any of the nurses phone calls. Brynn stated she will keep Dr Lusi Harkins updated on whether they see her or not this week.       Gume Castanon

## 2021-01-14 DIAGNOSIS — I10 ESSENTIAL HYPERTENSION: ICD-10-CM

## 2021-01-14 RX ORDER — LISINOPRIL 10 MG/1
TABLET ORAL
Qty: 90 TAB | Refills: 3 | Status: SHIPPED | OUTPATIENT
Start: 2021-01-14 | End: 2022-05-02

## 2021-03-05 ENCOUNTER — TELEPHONE (OUTPATIENT)
Dept: FAMILY MEDICINE CLINIC | Age: 66
End: 2021-03-05

## 2021-03-05 NOTE — TELEPHONE ENCOUNTER
Pt calling back, states she called back to the number you gave her in that she was told now that they don't have a psychiatrist there. She states that no one is trying to help her. Each time pt calls she sounds more and more slurred when speaking. I could not make out everything she was saying as she was rambling on in on about this in that. Pt asked if the call was being recorded, I told her I wasn't sure. She states she didn't care if the call was being recorded in that she want it to be heard of what she is saying. Pt keeps saying she's angry. Keeps stating she's being treated like a piece of crap. Ask that Dr. Bony Vu call her. Also wishes to discuss covid vaccine.

## 2021-03-05 NOTE — TELEPHONE ENCOUNTER
Pt calling back to office, she thanked me for relaying message to Dr. Antonio Habermann. She states Dr. Antonio Habermann did call her. She wants me to let doctor know that she called to [facility] in they couldn't get her appt until 3/25/21. She states that whom ever she talked to on the phone was rude in treated her like chaparro.

## 2021-03-05 NOTE — TELEPHONE ENCOUNTER
Returned call to patient. She sounded much more coherent. Provided her the phone number to [facility] and [employer]. She still adamantly refuses [facility]. Much calmer. Still denies SI, HI or self harming behavior.

## 2021-03-05 NOTE — TELEPHONE ENCOUNTER
Returned call to patient. ID confirmed x 2. Difficulty understanding much of what she was saying as she was slurring her words. She was very tangential and difficult to redirect. Insistent on reading off the \"10 reasons to drink and drive\" or something of that nature. Reports she was accused of drinking and driving while sitting in her driveway. I asked if I should be concerned about her safety, she stated no. I asked directly if she had any thoughts of harming herself, killing herself or harming anyone else, and she stated \"I would never want to do that\". I offered her the [facility], she declined. I also offered the [facility] information as she specifically requested to speak with \"a shrink\" because she wants professional help. However, she declined because \"I want nothing to do with [place]\". It seems that she is equating that with the [facility]. I offered a different Mental Health specialist and she was agreeable to reaching out to [facility] to get care; I provided their contact to her. I again inquired about her safety and any concerns about self harm and she reassured me she had no thoughts of self harm. Of note, I have approached her with concerns about her drinking at prior office visits because of calls like this in the past. There have been a number of occasions of her calling the office in a similar fashion, sometimes belligerent and seemingly under the influence. However, when addressed in office, she does not recall any incidents and adamantly insists she never makes calls of the like.      Sugar Mckinnon MD

## 2021-03-05 NOTE — TELEPHONE ENCOUNTER
Call time 18 min:38 seconds. Pt calling to office in states she need help ASAP for her physical and also her mental health. She states that she's hurt,angry and tired of being pushed over. She states she need to see a \"shrink\". She asked if was listening to her in I stated \" yes\". She mentioned the police coming to her home in accusing her of drinking in driving while sitting in her own driveway. She states the world is so cold in she doesn't understand why. She states she need someone to talk to. Ask that Dr. Madelyn Tucker call her ASAP.       Call [phone]

## 2021-04-29 ENCOUNTER — TELEPHONE (OUTPATIENT)
Dept: FAMILY MEDICINE CLINIC | Age: 66
End: 2021-04-29

## 2021-04-29 NOTE — TELEPHONE ENCOUNTER
----- Message from [place] sent at 4/29/2021 10:53 AM EDT -----  Regarding: Dr. Vannessa Arenas  General Message/Vendor Calls    Caller's first and last name:  Christy De La Cruz      Reason for call: Requesting a call back to discuss her health.        Callback required yes/no and why: yes      Best contact number(s):Home: [phone] Work:   Mobile: [phone]       Details to clarify the request: [phone]

## 2021-05-13 NOTE — TELEPHONE ENCOUNTER
Patient stated she thought she had a fever, but she has been feeling better. She asked to be scheduled with Dr. Barbra Gambino for a general check up/follow up on her chronic conditions.

## 2021-06-03 ENCOUNTER — OFFICE VISIT (OUTPATIENT)
Dept: FAMILY MEDICINE CLINIC | Age: 66
End: 2021-06-03
Payer: COMMERCIAL

## 2021-06-03 VITALS
HEART RATE: 81 BPM | RESPIRATION RATE: 16 BRPM | WEIGHT: 151 LBS | HEIGHT: 69 IN | SYSTOLIC BLOOD PRESSURE: 144 MMHG | DIASTOLIC BLOOD PRESSURE: 80 MMHG | BODY MASS INDEX: 22.36 KG/M2 | TEMPERATURE: 97.5 F | OXYGEN SATURATION: 99 %

## 2021-06-03 DIAGNOSIS — R73.09 ELEVATED HEMOGLOBIN A1C: ICD-10-CM

## 2021-06-03 DIAGNOSIS — F10.982 ALCOHOL-INDUCED INSOMNIA (HCC): ICD-10-CM

## 2021-06-03 DIAGNOSIS — E03.4 HYPOTHYROIDISM DUE TO ACQUIRED ATROPHY OF THYROID: ICD-10-CM

## 2021-06-03 DIAGNOSIS — I10 ESSENTIAL HYPERTENSION: Primary | ICD-10-CM

## 2021-06-03 DIAGNOSIS — R20.0 NUMBNESS AND TINGLING OF BOTH FEET: ICD-10-CM

## 2021-06-03 DIAGNOSIS — R20.2 NUMBNESS AND TINGLING OF BOTH FEET: ICD-10-CM

## 2021-06-03 DIAGNOSIS — I73.9 PVD (PERIPHERAL VASCULAR DISEASE) (HCC): ICD-10-CM

## 2021-06-03 DIAGNOSIS — F10.90 HEAVY ALCOHOL USE: ICD-10-CM

## 2021-06-03 DIAGNOSIS — F17.200 SMOKER: ICD-10-CM

## 2021-06-03 DIAGNOSIS — R26.9 GAIT DIFFICULTY: ICD-10-CM

## 2021-06-03 DIAGNOSIS — R41.3 MEMORY LOSS: ICD-10-CM

## 2021-06-03 LAB
ALBUMIN SERPL-MCNC: 3.9 G/DL (ref 3.5–5)
ALBUMIN/GLOB SERPL: 1.2 {RATIO} (ref 1.1–2.2)
ALP SERPL-CCNC: 66 U/L (ref 45–117)
ALT SERPL-CCNC: 15 U/L (ref 12–78)
ANION GAP SERPL CALC-SCNC: 5 MMOL/L (ref 5–15)
AST SERPL-CCNC: 14 U/L (ref 15–37)
BILIRUB SERPL-MCNC: 0.8 MG/DL (ref 0.2–1)
BUN SERPL-MCNC: 11 MG/DL (ref 6–20)
BUN/CREAT SERPL: 15 (ref 12–20)
CALCIUM SERPL-MCNC: 8.9 MG/DL (ref 8.5–10.1)
CHLORIDE SERPL-SCNC: 103 MMOL/L (ref 97–108)
CO2 SERPL-SCNC: 29 MMOL/L (ref 21–32)
CREAT SERPL-MCNC: 0.75 MG/DL (ref 0.55–1.02)
FOLATE SERPL-MCNC: 26.8 NG/ML (ref 5–21)
GLOBULIN SER CALC-MCNC: 3.2 G/DL (ref 2–4)
GLUCOSE SERPL-MCNC: 118 MG/DL (ref 65–100)
MAGNESIUM SERPL-MCNC: 2 MG/DL (ref 1.6–2.4)
PHOSPHATE SERPL-MCNC: 3 MG/DL (ref 2.6–4.7)
POTASSIUM SERPL-SCNC: 3.7 MMOL/L (ref 3.5–5.1)
PROT SERPL-MCNC: 7.1 G/DL (ref 6.4–8.2)
SODIUM SERPL-SCNC: 137 MMOL/L (ref 136–145)
TSH SERPL DL<=0.05 MIU/L-ACNC: 0.44 UIU/ML (ref 0.36–3.74)
VIT B12 SERPL-MCNC: 436 PG/ML (ref 193–986)

## 2021-06-03 PROCEDURE — 99214 OFFICE O/P EST MOD 30 MIN: CPT | Performed by: FAMILY MEDICINE

## 2021-06-03 RX ORDER — ATORVASTATIN CALCIUM 40 MG/1
40 TABLET, FILM COATED ORAL DAILY
Qty: 90 TABLET | Refills: 3 | Status: SHIPPED | OUTPATIENT
Start: 2021-06-03

## 2021-06-03 NOTE — PROGRESS NOTES
Chief Complaint   Patient presents with    Follow Up Chronic Condition     does not BP at home - does report swelling in her lower extremities. also has been experiencing some tingling in her feet as well - would like her A1c checked     1. Have you been to the ER, urgent care clinic since your last visit? Hospitalized since your last visit? No    2. Have you seen or consulted any other health care providers outside of the [address] [place] [address] since your last visit? Include any pap smears or colon screening.  No

## 2021-06-03 NOTE — PROGRESS NOTES
History of Present Illness:     Chief Complaint   Patient presents with    Follow Up Chronic Condition     does not BP at home - does report swelling in her lower extremities. also has been experiencing some tingling in her feet as well - would like her A1c checked       Marina Almaraz is a 78 y.o. female     HTN. Does not check BPs at home. Taking her Lisinopril 10mg as prescribed. Swelling in her legs and tingling in her feet. Thinks it may be her blood sugars. Hypothyroid. Last TSH stable on current Levothyroxine dose. Based on last lipid panel, her ASCVD risk is very high, >30%. Discussing starting statin today. Wants to be checked for juandice. Says her eyes are yellow sometimes. Heavy EtOH use. Last drink was 4-5 days ago and drank about 5 beers. Currently has a court case for her drinking. 12 pack used to last her 2 days now it lasts her longer. Unsteady gait. Continues to be an issue even though she has cut back on her drinking. PMH (REVIEWED):  Past Medical History:   Diagnosis Date    Anxiety     Arthritis     Depression     Dizziness     Falls     HTN (hypertension) 6/25/2010    Hypothyroid 6/25/2010    Thyroid disease     Wrist fracture, left        Current Medications/Allergies (REVIEWED):     Current Outpatient Medications on File Prior to Visit   Medication Sig Dispense Refill    levothyroxine (SYNTHROID) 150 mcg tablet TAKE 1 TABLET BY MOUTH EVERY MORNING BEFORE BREAKFAST 96 Tab 0    lisinopriL (PRINIVIL, ZESTRIL) 10 mg tablet TAKE 1 TABLET BY MOUTH EVERY DAY 90 Tab 3    miscellaneous medical supply (Blood Pressure Cuff) misc Check BPs daily 1 Each 0    Vitamin D2 1,250 mcg (50,000 unit) capsule TAKE ONE CAPSULE BY MOUTH EVERY MONTH 12 Cap 0    multivitamin with iron (DAILY MULTI-VITAMINS/IRON) tablet Take 1 Tab by mouth daily.  buPROPion XL (WELLBUTRIN XL) 150 mg tablet Take 1 Tab by mouth every morning.  (Patient not taking: Reported on 6/3/2021) 90 Tab 1    Omeprazole delayed release (PRILOSEC D/R) 20 mg tablet Take 1 Tab by mouth daily. (Patient not taking: Reported on 6/3/2021) 30 Tab 1     No current facility-administered medications on file prior to visit. Allergies   Allergen Reactions    Penicillins Other (comments)     Yeast infection         Review of Systems:   Review of Systems   Constitutional: Negative for chills and fever. Cardiovascular: Negative for chest pain and leg swelling. Neurological: Positive for dizziness and tingling. Objective:     Vitals:    06/03/21 1018   BP: (!) 144/80   Pulse: 81   Resp: 16   Temp: 97.5 °F (36.4 °C)   TempSrc: Temporal   SpO2: 99%   Weight: 151 lb (68.5 kg)   Height: 5' 9\" (1.753 m)       Physical Exam:  General appearance - alert, well appearing, and in no distress  Mental status - alert, oriented to person, place, and time, normal mood, behavior, speech, dress, motor activity, and thought processes  Chest - clear to auscultation, no wheezes, rales or rhonchi, symmetric air entry  Heart - normal rate, regular rhythm, normal S1, S2, no murmurs, rubs, clicks or gallops  Extremities - Hyperpigmentation of bilateral feet, no hair growth on lower legs/ toes. Diminished pedal pulses bilaterally, can appreciate a 1+ DP pulse bilaterally. Reduced sensation to monofilament testing at great toe and pad of foot b/l. Diminished pedal pulses  Hyperpigmentation of bottom of feet, no hair growth  Recent Labs:  No results found for this or any previous visit (from the past 12 hour(s)). Assessment and Plan:       ICD-10-CM ICD-9-CM    1. Essential hypertension  M57 519.7 METABOLIC PANEL, COMPREHENSIVE      atorvastatin (LIPITOR) 40 mg tablet      METABOLIC PANEL, COMPREHENSIVE   2. Hypothyroidism due to acquired atrophy of thyroid  E03.4 244.8 TSH 3RD GENERATION     246.8 TSH 3RD GENERATION   3. Gait difficulty  R26.9 781.2 REFERRAL TO NEUROLOGY   4. Elevated hemoglobin A1c  R73.09 790.29    5.  Smoker F17.200 305. 1 ANKLE BRACHIAL INDEX      atorvastatin (LIPITOR) 40 mg tablet   6. Heavy alcohol use  Z78.9 V49.89 VITAMIN B12 & FOLATE      METABOLIC PANEL, COMPREHENSIVE      REFERRAL TO NEUROLOGY      METABOLIC PANEL, COMPREHENSIVE      VITAMIN B12 & FOLATE   7. Numbness and tingling of both feet  H16.5 503.8 METABOLIC PANEL, COMPREHENSIVE    R20.2  MAGNESIUM      PHOSPHORUS      REFERRAL TO NEUROLOGY      ANKLE BRACHIAL INDEX      PHOSPHORUS      MAGNESIUM      METABOLIC PANEL, COMPREHENSIVE   8. PVD (peripheral vascular disease) ([facility])  I73.9 443. 9 ANKLE BRACHIAL INDEX      atorvastatin (LIPITOR) 40 mg tablet   9. Alcohol-induced insomnia ([facility])  F10.982 291.82    10. Memory loss  R41.3 780.93 REFERRAL TO NEUROLOGY       HTN, BP above goal in office  Unfortunately it was not repeated before she left the office    Hypothyroid  Checking TSH    Gait difficulty/ dizziness/ falls  Suspect this is sequela of her heavy EtOH use  Referred her to PT in the past for gait training but failed to follow up  Referred to Neurology to further eval as well    Numbness and tingling in feet  Suspect she has PVD based on exam findings and long smoking hx and also neuropathy from her longstanding, heavy EtOH use  Will check ABIs  Checking B12, Folate, lytes    Heavy smoker  Counseled on smoking cessation    Memory loss, likely related to heavy EtOH use    EtOH abuse  Hx of being in rehab in the past  Currently, pt reports her last drink being 4-5 days ago and 4-5 beers  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 Medication risks, benefits, costs, interactions, and alternatives were discussed as indicated. I advised her to contact the office if her condition worsens, changes or fails to improve as anticipated.  She expressed understanding with the diagnosis(es) and plan.

## 2021-06-03 NOTE — PATIENT INSTRUCTIONS
Stopping Smoking: Care Instructions  Your Care Instructions     Cigarette smokers crave the nicotine in cigarettes. Giving it up is much harder than simply changing a habit. Your body has to stop craving the nicotine. It is hard to quit, but you can do it. There are many tools that people use to quit smoking. You may find that combining tools works best for you. There are several steps to quitting. First you get ready to quit. Then you get support to help you. After that, you learn new skills and behaviors to become a nonsmoker. For many people, a necessary step is getting and using medicine. Your doctor will help you set up the plan that best meets your needs. You may want to attend a smoking cessation program to help you quit smoking. When you choose a program, look for one that has proven success. Ask your doctor for ideas. You will greatly increase your chances of success if you take medicine as well as get counseling or join a cessation program.  Some of the changes you feel when you first quit tobacco are uncomfortable. Your body will miss the nicotine at first, and you may feel short-tempered and grumpy. You may have trouble sleeping or concentrating. Medicine can help you deal with these symptoms. You may struggle with changing your smoking habits and rituals. The last step is the tricky one: Be prepared for the smoking urge to continue for a time. This is a lot to deal with, but keep at it. You will feel better. Follow-up care is a key part of your treatment and safety. Be sure to make and go to all appointments, and call your doctor if you are having problems. It's also a good idea to know your test results and keep a list of the medicines you take. How can you care for yourself at home? · Ask your family, friends, and coworkers for support. You have a better chance of quitting if you have help and support.   · Join a support group, such as Nicotine Anonymous, for people who are trying to quit smoking. · Consider signing up for a smoking cessation program, such as the American Lung Association's Freedom from Smoking program.  · Get text messaging support. Go to the website at www.smokefree. gov to sign up for the [facility] program.  · Set a quit date. Pick your date carefully so that it is not right in the middle of a big deadline or stressful time. Once you quit, do not even take a puff. Get rid of all ashtrays and lighters after your last cigarette. Clean your house and your clothes so that they do not smell of smoke. · Learn how to be a nonsmoker. Think about ways you can avoid those things that make you reach for a cigarette. ? Avoid situations that put you at greatest risk for smoking. For some people, it is hard to have a drink with friends without smoking. For others, they might skip a coffee break with coworkers who smoke. ? Change your daily routine. Take a different route to work or eat a meal in a different place. · Cut down on stress. Calm yourself or release tension by doing an activity you enjoy, such as reading a book, taking a hot bath, or gardening. · Talk to your doctor or pharmacist about nicotine replacement therapy, which replaces the nicotine in your body. You still get nicotine but you do not use tobacco. Nicotine replacement products help you slowly reduce the amount of nicotine you need. These products come in several forms, many of them available over-the-counter:  ? Nicotine patches  ? Nicotine gum and lozenges  ? Nicotine inhaler  · Ask your doctor about bupropion (Wellbutrin) or varenicline (Chantix), which are prescription medicines. They do not contain nicotine. They help you by reducing withdrawal symptoms, such as stress and anxiety. · Some people find hypnosis, acupuncture, and massage helpful for ending the smoking habit. · Eat a healthy diet and get regular exercise. Having healthy habits will help your body move past its craving for nicotine.   · Be prepared to keep trying. Most people are not successful the first few times they try to quit. Do not get mad at yourself if you smoke again. Make a list of things you learned and think about when you want to try again, such as next week, next month, or next year. Where can you learn more? Go to http://www.gray.com/  Enter [id number] in the search box to learn more about \"Stopping Smoking: Care Instructions. \"  Current as of: March 12, 2020               Content Version: 12.8  © 1043-5305 [employer]. Care instructions adapted under license by [employer] (which disclaims liability or warranty for this information). If you have questions about a medical condition or this instruction, always ask your healthcare professional. [place] [address] any warranty or liability for your use of this information. Learning About Alcohol Use Disorder  What is alcohol use disorder? Alcohol use disorder means that a person drinks alcohol even though it causes harm to themselves or others. It can range from mild to severe. The more signs of this disorder you have, the more severe it may be. Moderate to severe alcohol use disorder is sometimes called addiction. People who have it may find it hard to control their use of alcohol. People who have this disorder may argue with others about how much they're drinking. Their job may be affected because of drinking. They may drink when it's dangerous or illegal, such as when they drive. They also may have a strong need, or craving, to drink. They may feel like they must drink just to get by. Their drinking may increase their risk of getting hurt or being in a car crash. Over time, drinking too much alcohol may cause health problems. These may include high blood pressure, liver problems, or problems with digestion. What are the signs?   Maybe you've wondered about your alcohol habits, or how to tell if your drinking is becoming a problem. Here are some of the signs of alcohol use disorder. You may have it if you have two or more of the following signs:  · You drink larger amounts of alcohol than you ever meant to. Or you've been drinking for a longer time than you ever meant to. · You can't cut down or control your use. Or you constantly wish you could cut down. · You spend a lot of time getting or drinking alcohol or recovering from its effects. · You have strong cravings for alcohol. · You can no longer do your main jobs at work, at school, or at home. · You keep drinking alcohol, even though your use hurts your relationships. · You have stopped doing important activities because of your alcohol use. · You drink alcohol in situations where doing so is dangerous. · You keep drinking alcohol even though you know it's causing health problems. · You need more and more alcohol to get the same effect, or you get less effect from the same amount over time. This is called tolerance. · You have uncomfortable symptoms when you stop drinking alcohol or use less. This is called withdrawal.  Alcohol use disorder can range from mild to severe. The more signs you have, the more severe the disorder may be. Moderate to severe alcohol use disorder is sometimes called addiction. You might not realize that your drinking is a problem. You might not drink large amounts when you drink. Or you might go for days or weeks between drinking episodes. But even if you don't drink very often, your drinking could still be harmful and put you at risk. How is alcohol use disorder treated? Getting help is up to you. But you don't have to do it alone. There are many people and kinds of treatments that can help. Treatment for alcohol use disorder can include:  · Group therapy, one or more types of counseling, and alcohol education. · Medicines that help to:  ? Reduce withdrawal symptoms and help you safely stop drinking. ?  Reduce cravings for alcohol. · Support groups. These groups include Alcoholics Anonymous and [facility] (Self-Management and Recovery Training). Some people are able to stop or cut back on drinking with help from a counselor. People who have moderate to severe alcohol use disorder may need medical treatment. They may need to stay in a hospital or treatment center. You may have a treatment team to help you. This team may include a psychologist or psychiatrist, counselors, doctors, social workers, nurses, and a . A  helps plan and manage your treatment. Follow-up care is a key part of your treatment and safety. Be sure to make and go to all appointments, and call your doctor if you are having problems. It's also a good idea to know your test results and keep a list of the medicines you take. Where can you learn more? Go to http://www.gray.com/  Enter H758 in the search box to learn more about \"Learning About Alcohol Use Disorder. \"  Current as of: June 29, 2020               Content Version: 12.8  © 2006-2021 [employer]. Care instructions adapted under license by [employer] (which disclaims liability or warranty for this information). If you have questions about a medical condition or this instruction, always ask your healthcare professional. [place] [address] any warranty or liability for your use of this information. Statins: Care Instructions  Your Care Instructions     Statins are medicines that lower your cholesterol and your risk for a heart attack and stroke. Cholesterol is a type of fat in your blood. If you have too much cholesterol, it can build up in blood vessels. This raises your risk of heart disease, heart attack, and stroke. Statins lower cholesterol by blocking how much your body makes. This prevents cholesterol from building up in your blood vessels. This is called hardening of the arteries.  It is the starting point for some heart and blood flow problems, such as heart disease. Statins may also reduce inflammation around the buildup (called plaque). This can lower the risk that the plaque will break apart and lead to a heart attack or stroke. A heart-healthy lifestyle is important for lowering your risk whether you take statins or not. This includes eating healthy foods, being active, staying at a healthy weight, and not smoking. You must take statins regularly for them to work well. If you stop, your cholesterol and your risk will go back up. Examples of statins include:  · Atorvastatin (Lipitor). · Lovastatin (Mevacor). · Pravastatin (Pravachol). · Simvastatin (Zocor). Statins interact with many medicines. So tell your doctor all of the other medicines that you take. These include prescription medicines, over-the-counter medicines, dietary supplements, and herbal products. Follow-up care is a key part of your treatment and safety. Be sure to make and go to all appointments, and call your doctor if you are having problems. It's also a good idea to know your test results and keep a list of the medicines you take. How can you care for yourself at home? · Take statins exactly as your doctor tells you. High cholesterol has no symptoms. So it is easy to forget to take the pills. Try to make a system that reminds you to take them. · Do not take two or more medicines at the same time unless the doctor told you to. Statins can interact with other medicines. · Always tell your doctor if you think you are having a side effect. If side effects are a problem with one medicine, a different one may be used. · Keep making the lifestyle changes your doctor suggests. Eat heart-healthy foods, be active, don't smoke, and stay at a healthy weight. · Talk to your doctor about avoiding grapefruit juice if you take statins. Grapefruit juice can raise the level of this medicine in your blood.  This could increase side effects. When should you call for help? Watch closely for changes in your health, and be sure to contact your doctor if:    · You think you are having problems with your medicine.     · You have aches or muscle pain. Where can you learn more? Go to http://www.gray.com/  Enter R358 in the search box to learn more about \"Statins: Care Instructions. \"  Current as of: August 31, 2020               Content Version: 12.8  © 2006-2021 [employer]. Care instructions adapted under license by [employer] (which disclaims liability or warranty for this information). If you have questions about a medical condition or this instruction, always ask your healthcare professional. [place] [address] any warranty or liability for your use of this information. Peripheral Arterial Disease (PAD): Care Instructions  Overview  Peripheral arterial disease (PAD) occurs when the blood vessels (arteries) that supply blood to the legs, belly, pelvis, arms, or neck get narrow or blocked. This reduces blood flow to that area. The legs are affected most often. PAD is often caused by fatty buildup (plaque) in the arteries. This buildup is also called \"hardening\" of the arteries. Your risk of PAD increases if you smoke or have high cholesterol, high blood pressure, diabetes, or a family history of PAD. Many people don't have symptoms. If you do have symptoms, you may have weak or tired legs, difficulty walking or balancing, or pain. If you have pain, you might feel a tight, aching, or squeezing pain in the calf, foot, thigh, or buttock that occurs during exercise. The pain usually gets worse during exercise and goes away when you rest. If PAD gets worse, you may have symptoms of poor blood flow, such as leg pain when you rest.  Medicines and lifestyle changes may help your symptoms and lower your risk of heart attack and stroke.  In some cases, surgery or other treatment is needed. It is important that you follow up with your doctor. Follow-up care is a key part of your treatment and safety. Be sure to make and go to all appointments, and call your doctor if you are having problems. It's also a good idea to know your test results and keep a list of the medicines you take. How can you care for yourself at home? · Do not smoke. Smoking can make PAD worse. If you need help quitting, talk to your doctor about stop-smoking programs and medicines. These can increase your chances of quitting for good. · Take your medicines exactly as prescribed. Call your doctor if you think you are having a problem with your medicine. · If you take a blood thinner, such as aspirin, be sure to get instructions about how to take your medicine safely. Blood thinners can cause serious bleeding problems. · Ask your doctor if a cardiac rehab program is right for you. Cardiac rehab can help you make lifestyle changes. In cardiac rehab, a team of health professionals provides education and support to help you make new, healthy habits. · Eat heart-healthy foods such as fruits, vegetables, whole grains, fish, lean meats, and low-fat or nonfat dairy foods. Limit sodium, sugar, and alcohol. · If your doctor recommends it, get more exercise. Walking is a good choice. Bit by bit, increase the amount you walk every day. Try for at least 30 minutes on most days of the week. If you have symptoms when you exercise, ask your doctor about a special exercise program that may help relieve your symptoms. · Stay at a healthy weight. Lose weight if you need to. · Take good care of your feet. ? Treat cuts and scrapes on your legs right away. Poor blood flow prevents (or slows) quick healing of even small cuts or scrapes. This is even more important if you have diabetes. ? Avoid shoes that are too tight or that rub your feet. Shoes should be comfortable and fit well. ?  Avoid socks or stockings that are tight enough to leave elastic-band marks on your legs. Tight socks can make circulation problems worse. ? Keep your feet clean and moisturized to prevent drying and cracking. Place cotton or lamb's wool between your toes to prevent rubbing and to absorb moisture. ? If you have a sore on your leg or foot, keep it dry and cover it with a nonstick bandage until you see your doctor. When should you call for help? Call 911 anytime you think you may need emergency care. For example, call if:    · You have symptoms of a heart attack. These may include:  ? Chest pain or pressure, or a strange feeling in the chest.  ? Sweating. ? Shortness of breath. ? Nausea or vomiting. ? Pain, pressure, or a strange feeling in the back, neck, jaw, or upper belly or in one or both shoulders or arms. ? Lightheadedness or sudden weakness. ? A fast or irregular heartbeat. After you call 911, the  may tell you to chew 1 adult-strength or 2 to 4 low-dose aspirin. Wait for an ambulance. Do not try to drive yourself.    · You have sudden, severe leg pain, and your leg is cool and pale.     · You have symptoms of a stroke. These may include:  ? Sudden numbness, tingling, weakness, or loss of movement in your face, arm, or leg, especially on only one side of your body. ? Sudden vision changes. ? Sudden trouble speaking. ? Sudden confusion or trouble understanding simple statements. ? Sudden problems with walking or balance. ? A sudden, severe headache that is different from past headaches. Call your doctor now or seek immediate medical care if:    · You have leg pain that does not go away even if you rest.     · Your leg pain changes or gets worse.  For example, if you have more pain with normal activity or the same pain with decreased activity, you should call.     · You have cold or numb feet or toes.     · You have leg or foot sores that are slow to heal.     · The skin on your legs or feet changes color.     · You have an open sore on your leg or foot that is infected. Signs of infection include:  ? Increased pain, swelling, warmth, or redness. ? Red streaks leading from the sore. ? Pus draining from the sore. ? A fever. Watch closely for changes in your health, and be sure to contact your doctor if you have any problems. Where can you learn more? Go to http://www.gray.com/  Enter H735 in the search box to learn more about \"Peripheral Arterial Disease (PAD): Care Instructions. \"  Current as of: August 31, 2020               Content Version: 12.8  © 2207-4913 [employer]. Care instructions adapted under license by [employer] (which disclaims liability or warranty for this information). If you have questions about a medical condition or this instruction, always ask your healthcare professional. [address] any warranty or liability for your use of this information.

## 2021-06-04 NOTE — PROGRESS NOTES
Labs look good  Vitamin levels WNL  Thyroid good    Please call and notify the patient that all of her labs are normal. No low vitamin levels.  Her electrolytes are good and her thyroid is normal.

## 2021-06-09 ENCOUNTER — TELEPHONE (OUTPATIENT)
Dept: FAMILY MEDICINE CLINIC | Age: 66
End: 2021-06-09

## 2021-06-10 ENCOUNTER — TELEPHONE (OUTPATIENT)
Dept: FAMILY MEDICINE CLINIC | Age: 66
End: 2021-06-10

## 2021-06-10 NOTE — TELEPHONE ENCOUNTER
Patient stated that she had to reschedule her Neuro appointment to 6/24/21. She stated she has been experiencing bilateral lower extremity swelling as well as tingling in her feet. Her right foot also appears darker than usual. She also asked if she could have a letter stating that she is allowed to be off of her feet due to the swelling.     Patient was also wondering if starting magnesium supplements would be beneficial.

## 2021-06-10 NOTE — LETTER
6/11/2021 12:21 PM 
 
Ms. Jaciel Rueda [address] [place] [phone] To whom it may concern, 
 
Jaciel Rueda is currently under the care of [address] [place] [address]. Please allow her to take breaks and be off of her feet due to issues with pain and swelling. She is currently undergoing work up and treatment.   
 
 
 
Sincerely, 
 
 
Davida Frausto MD

## 2021-06-10 NOTE — TELEPHONE ENCOUNTER
----- Message from [employer] sent at 6/10/2021 12:30 PM EDT -----  Regarding: Dr. Ashley Mendez/telephone  General Message/Vendor Calls    Caller's first and last name: n/a      Reason for call: Pt requests the doctor or nurse to call her regarding some ongoing foot and ankle issues.        Callback required yes/no and why: yes      Best contact number(s): [phone]      Details to clarify the request: n/a       [employer]

## 2021-06-11 NOTE — TELEPHONE ENCOUNTER
Drafted requested letter. Reviewed nurse call. Pt was referred to Neurology at our last visit.  She also had an ultrasound ordered to further eval.

## 2021-06-11 NOTE — TELEPHONE ENCOUNTER
[facility] Front Office  Patient return call     Caller's first and last name and relationship (if not the patient):       Best contact number(s):[phone]       Whose call is being returned:Loreto       Details to clarify the request:refused any detail       Juana Brink

## 2021-06-16 ENCOUNTER — HOSPITAL ENCOUNTER (OUTPATIENT)
Dept: VASCULAR SURGERY | Age: 66
Discharge: HOME | End: 2021-06-16
Attending: FAMILY MEDICINE

## 2021-06-16 DIAGNOSIS — R20.2 NUMBNESS AND TINGLING OF BOTH FEET: ICD-10-CM

## 2021-06-16 DIAGNOSIS — R20.0 NUMBNESS AND TINGLING OF BOTH FEET: ICD-10-CM

## 2021-06-16 DIAGNOSIS — I73.9 PVD (PERIPHERAL VASCULAR DISEASE) (HCC): ICD-10-CM

## 2021-06-16 DIAGNOSIS — F17.200 SMOKER: ICD-10-CM

## 2021-06-16 LAB
LEFT ABI: 0.99
LEFT ANTERIOR TIBIAL: 113 MMHG
LEFT ARM BP: 102 MMHG
LEFT POSTERIOR TIBIAL: 113 MMHG
LEFT TBI: 0.34
LEFT TOE PRESSURE: 39 MMHG
RIGHT ABI: 1.1
RIGHT ANTERIOR TIBIAL: 125 MMHG
RIGHT ARM BP: 114 MMHG
RIGHT POSTERIOR TIBIAL: 122 MMHG
RIGHT TBI: 0.25
RIGHT TOE PRESSURE: 28 MMHG

## 2021-06-16 PROCEDURE — 93922 UPR/L XTREMITY ART 2 LEVELS: CPT

## 2021-06-18 ENCOUNTER — TELEPHONE (OUTPATIENT)
Dept: FAMILY MEDICINE CLINIC | Age: 66
End: 2021-06-18

## 2021-06-18 DIAGNOSIS — I73.9 PVD (PERIPHERAL VASCULAR DISEASE) (HCC): Primary | ICD-10-CM

## 2021-06-18 NOTE — TELEPHONE ENCOUNTER
Will have nursing call to convey the following:    - The test shows that the blood flow in your legs and feet is poor, this is likely causing some of the pain and discomfort you have been having.  - You need to follow up with a Vascular Doctor for further evaluation to determine treatment. A referral has been placed to Dr. Alvarez Mancilla. Please call [phone] to set up that appointment.

## 2021-06-18 NOTE — TELEPHONE ENCOUNTER
Called pt per Dr. Mani Martinez to sched OV for July. Pt didn't Bethanie Martinez didn't indicate reason for appt.  Will send her a staff message to inquire

## 2021-06-22 NOTE — TELEPHONE ENCOUNTER
SIMM asking patient to call the office back. Attempted to relay results, provide info for the vascular doctor, and schedule her for a follow up in July with Dr. Wood Tabor.

## 2021-06-24 ENCOUNTER — OFFICE VISIT (OUTPATIENT)
Dept: NEUROLOGY | Age: 66
End: 2021-06-24
Payer: COMMERCIAL

## 2021-06-24 VITALS
DIASTOLIC BLOOD PRESSURE: 83 MMHG | HEIGHT: 70 IN | SYSTOLIC BLOOD PRESSURE: 121 MMHG | OXYGEN SATURATION: 100 % | BODY MASS INDEX: 21.33 KG/M2 | RESPIRATION RATE: 14 BRPM | WEIGHT: 149 LBS | HEART RATE: 101 BPM

## 2021-06-24 DIAGNOSIS — R26.9 GAIT ABNORMALITY: Primary | ICD-10-CM

## 2021-06-24 DIAGNOSIS — R20.0 NUMBNESS AND TINGLING OF BOTH FEET: ICD-10-CM

## 2021-06-24 DIAGNOSIS — R20.2 NUMBNESS AND TINGLING OF BOTH FEET: ICD-10-CM

## 2021-06-24 DIAGNOSIS — R20.2 PINS AND NEEDLES SENSATION: ICD-10-CM

## 2021-06-24 PROCEDURE — 99204 OFFICE O/P NEW MOD 45 MIN: CPT | Performed by: PSYCHIATRY & NEUROLOGY

## 2021-06-24 NOTE — PROGRESS NOTES
Chief Complaint   Patient presents with    New Patient    Dizziness     and imbalance     Sx started around 2011. Feels that legs give out at time.

## 2021-06-24 NOTE — PROGRESS NOTES
[facility] and [address] at [facility] at [address] [facility], [address] [place] [id number] [address] [facility], [address] [place] [address]  [phone] Office  [phone]           Referring: Maria R Ortiz MD  [address] [address]    Chief Complaint   Patient presents with    New Patient    Dizziness     and imbalance   69-year-old lady presents today for initial neurologic consultation regarding imbalance and difficulty with gait. She says that she believes her symptoms started probably in 2011 when she fell off her screen imports. She painted with some paint that her brother told her not to use and in any regard she went outside and it was raining and she slipped and fell on the ground injured her self. Cut her left hand. In any regard she notes that she has difficulty ambulating. Feels imbalance. Has pins type sensation and numbness in her feet. Has to hold on in the shower if her eyes are closed. Uses [employer] for long distance walking. Says that her feet feel fat right greater than left. Sometimes they turn dark. Her imbalance and gait difficulty have been getting progressively worse. Record review finds note by Dr. Gregg Horta June 1821 where she was found to have significant peripheral vascular disease based on her ABIs and she was referred to vascular surgery. Office note with Dr. Gregg Horta on [address] where patient was following up and she was complaining of swelling in her legs and tingling in her feet. She was noted to have heavy alcohol use and apparently has a court case pending secondary to some type of incident related to alcohol. She was referred here for numbness and tingling of her feet as well as gait difficulty.     Laboratory analysis Alexandrea 3, 2021  TSH normal  B12 and folate normal  Metabolic panel unremarkable except for glucose 118  Magnesium normal  Phosphorus normal    Past Medical History:   Diagnosis Date    Anxiety     Arthritis     Depression     Dizziness     Falls     HTN (hypertension) 6/25/2010    Hypothyroid 6/25/2010    Thyroid disease     Wrist fracture, left        Past Surgical History:   Procedure Laterality Date    HX GYN      HX HYSTERECTOMY         Current Outpatient Medications   Medication Sig Dispense Refill    atorvastatin (LIPITOR) 40 mg tablet Take 1 Tablet by mouth daily. 90 Tablet 3    levothyroxine (SYNTHROID) 150 mcg tablet TAKE 1 TABLET BY MOUTH EVERY MORNING BEFORE BREAKFAST 96 Tab 0    lisinopriL (PRINIVIL, ZESTRIL) 10 mg tablet TAKE 1 TABLET BY MOUTH EVERY DAY 90 Tab 3    miscellaneous medical supply (Blood Pressure Cuff) misc Check BPs daily 1 Each 0    multivitamin with iron (DAILY MULTI-VITAMINS/IRON) tablet Take 1 Tab by mouth daily.  buPROPion XL (WELLBUTRIN XL) 150 mg tablet Take 1 Tab by mouth every morning. (Patient not taking: Reported on 6/3/2021) 90 Tab 1    Omeprazole delayed release (PRILOSEC D/R) 20 mg tablet Take 1 Tab by mouth daily. (Patient not taking: Reported on 6/3/2021) 30 Tab 1    Vitamin D2 1,250 mcg (50,000 unit) capsule TAKE ONE CAPSULE BY MOUTH EVERY MONTH (Patient not taking: Reported on 6/24/2021) 12 Cap 0        Allergies   Allergen Reactions    Penicillins Other (comments)     Yeast infection       Social History     Tobacco Use    Smoking status: Current Every Day Smoker     Packs/day: 1.00     Years: 35.00     Pack years: 35.00     Types: Cigarettes    Smokeless tobacco: Never Used   Vaping Use    Vaping Use: Never used   Substance Use Topics    Alcohol use: Yes     Alcohol/week: 20.0 standard drinks     Types: 24 Cans of beer per week    Drug use: Yes     Types: Marijuana       Family History   Problem Relation Age of Onset    Diabetes Mother        Review of Systems  Pertinent positives and negatives as noted.       Examination  Visit Vitals  /83 (BP 1 Location: Left upper arm, BP Patient Position: Standing, BP Cuff Size: Adult)   Pulse (!) 101   Resp 14   Ht 5' 10\" (1.778 m)   Wt 67.6 kg (149 lb)   SpO2 100%   BMI 21.38 kg/m²     Awake alert oriented and conversant. Normal speech and lines. Normal cognition. Cranial nerves intact 2-12. No nystagmus. No pronation or drift. Strength is full in the upper and lower extremities in all muscle groups to direct testing. She has no lower extremity reflexes. Graded sensory loss. Romberg sign present. Impression/Plan  24-year-old lady with symptoms suggestive of peripheral neuropathy likely on the basis of alcohol use  EMG of the bilateral lower extremities  Follow-up after    Jesus Santo MD          This note was created using voice recognition software. Despite editing, there may be syntax errors.

## 2021-07-23 ENCOUNTER — TELEPHONE (OUTPATIENT)
Dept: FAMILY MEDICINE CLINIC | Age: 66
End: 2021-07-23

## 2021-07-23 NOTE — TELEPHONE ENCOUNTER
Pt called into the office asking to speak to dr joya . While gathering her information to get her chart pulled up I had a hard time understanding her and had to ask her to repeate herself several times . Once I finally got her pulled up pt talked for a few minutes I was only able to understand a handful of things she said as she was slurring her words a lot and it sounded very muffled . She said she needed to talk to dr joya and that she felt hurt and neglected . She said she never had a dr she couldn't talk to . She went on about lots of stuff but I had a very hard time understanding her . When I told her I would send dr Saavedra Record a message to give her a call she said she was sorry and she did not mean to put this all off on me . I told her its ok and I understand and that Im here to help . Pt then told me to please make sure the dr calls her and she told me to have a good weekend and to be safe and then disconnected . I am a little concerned as the pt was slurring her words and talking about random things I hope she is safely at home  , but I don't really know what else needs to be done at this point ?

## 2021-08-02 ENCOUNTER — TELEPHONE (OUTPATIENT)
Dept: FAMILY MEDICINE CLINIC | Age: 66
End: 2021-08-02

## 2021-08-02 NOTE — TELEPHONE ENCOUNTER
----- Message from [employer], [facility] sent at 8/2/2021  3:17 PM EDT -----  Regarding: Dr. Lucius Maria  General Message/Vendor Calls    Caller's first and last name: Elpidio Graham      Reason for call: callback request      Callback required yes/no and why: yes/pt request      Best contact number(s): [phone] or [phone]      Details to clarify the request: pt is requesting a call back from Dr. Carrasquillo Members states that she DOES NOT want to speak with a nurse      [facility], [facility]

## 2021-08-03 NOTE — TELEPHONE ENCOUNTER
Returned call to patient. She had questions about taking the [address] vaccine which we clarified.

## 2021-08-30 ENCOUNTER — TELEPHONE (OUTPATIENT)
Dept: FAMILY MEDICINE CLINIC | Age: 66
End: 2021-08-30

## 2021-08-30 NOTE — TELEPHONE ENCOUNTER
----- Message from Jennifer Stephens sent at 8/30/2021 10:48 AM EDT -----  Regarding: Dr. Nader Murphy  General Message/Vendor Calls    Caller's first and last name:self      Reason for call:Pt advised her prescription was messed up somehow and that she only received one pill instead of her full refill for 30 days.        Callback required yes/no and why:yes, to clarify       Best contact number(s):[phone]      Details to clarify the request:n/a      Jennifer Stephens

## 2021-08-31 DIAGNOSIS — E03.4 HYPOTHYROIDISM DUE TO ACQUIRED ATROPHY OF THYROID: ICD-10-CM

## 2021-08-31 RX ORDER — LEVOTHYROXINE SODIUM 150 UG/1
TABLET ORAL
Qty: 96 TABLET | Refills: 3 | Status: SHIPPED | OUTPATIENT
Start: 2021-08-31 | End: 2022-10-03

## 2021-08-31 NOTE — TELEPHONE ENCOUNTER
Good afternoon ,  I received a message stating that the patient is requesting a refill for:  levothyroxine (SYNTHROID) 150 mcg tablet  To be sent to the pharmacy. You saw this patient on 06/03/2021. Can you help me with this please?   Thank you

## 2021-09-09 ENCOUNTER — TELEPHONE (OUTPATIENT)
Dept: FAMILY MEDICINE CLINIC | Age: 66
End: 2021-09-09

## 2021-09-09 DIAGNOSIS — Z01.83 BLOOD TYPING ENCOUNTER: Primary | ICD-10-CM

## 2021-09-09 NOTE — TELEPHONE ENCOUNTER
----- Message from Rani Clark sent at 9/9/2021  9:46 AM EDT -----  Regarding: Suzy/telephone  Pt is requesting for you to call her in regard to a question she has. She is requesting to know what her blood type Pts number is [phone].

## 2021-11-18 DIAGNOSIS — R79.89 LOW VITAMIN D LEVEL: ICD-10-CM

## 2021-11-19 RX ORDER — ERGOCALCIFEROL (VITAMIN D2) 10 MCG
2 TABLET ORAL DAILY
Qty: 180 TABLET | Refills: 3 | Status: SHIPPED | OUTPATIENT
Start: 2021-11-19

## 2022-02-25 ENCOUNTER — TELEPHONE (OUTPATIENT)
Dept: FAMILY MEDICINE CLINIC | Age: 67
End: 2022-02-25

## 2022-02-25 NOTE — TELEPHONE ENCOUNTER
----- Message from Aleksey Pinto sent at 2/25/2022 11:19 AM EST -----  Subject: Appointment Request    Reason for Call: Routine Physical Exam    QUESTIONS  Type of Appointment? Established Patient  Reason for appointment request? Available appointments did not meet   patient need  Additional Information for Provider? Patient would like to schedule a   yearly physical but available appointments did not meet patient need. Please call to schedule.  ---------------------------------------------------------------------------  --------------  CALL BACK INFO  What is the best way for the office to contact you? OK to leave message on   voicemail  Preferred Call Back Phone Number? [phone]  ---------------------------------------------------------------------------  --------------  SCRIPT ANSWERS  Relationship to Patient? Self  (Is the patient requesting their annual physical and does not need PAP or   AWV per above?)? Yes   Have you been diagnosed with, awaiting test results for, or told that you   are suspected of having COVID-19 (Coronavirus)? (If patient has tested   negative or was tested as a requirement for work, school, or travel and   not based on symptoms, answer no)? No  Within the past 10 days have you developed any of the following symptoms   (answer no if symptoms have been present longer than 10 days or began   more than 10 days ago)? Fever or Chills, Cough, Shortness of breath or   difficulty breathing, Loss of taste or smell, Sore throat, Nasal   congestion, Sneezing or runny nose, Fatigue or generalized body aches   (answer no if pain is specific to a body part e.g. back pain), Diarrhea,   Headache? No  Have you had close contact with someone with COVID-19 in the last 7 days? No  (Service Expert  click yes below to proceed with [employer] As Usual   Scheduling)?  Yes

## 2022-03-19 PROBLEM — E55.9 VITAMIN D DEFICIENCY: Status: ACTIVE | Noted: 2018-02-18

## 2022-03-23 ENCOUNTER — TELEPHONE (OUTPATIENT)
Dept: FAMILY MEDICINE CLINIC | Age: 67
End: 2022-03-23

## 2022-03-23 NOTE — TELEPHONE ENCOUNTER
Called patient to get her scheduled for an appointment (physical) with Dr. Jamie Mohamud that the patient requested. Left vm for patient to call the office back to get scheduled.

## 2022-04-02 NOTE — PATIENT INSTRUCTIONS
RESULT POLICY      If we have ordered testing for you, know that; \"NO NEWS IS GOOD NEWS! \"     It is our policy that we no longer call patients with results, nor do we  give test results over the phone. We schedule follow up appointments so that your results can be discussed in person. This allows you to address any questions you have regarding the results. If you choose to go to an imaging center outside of [facility], it is your responsibility to bring imaging report and disc to follow up appointment. If something of concern is revealed on your test, we will contact you to discuss the matter and if needed schedule a sooner follow up appointment. Additionally, results may be found by using the My Chart feature and one of our patient service representatives at the  can give you instructions on how to access this feature to utilize our electronic medical record system. Thank you for your understanding. [facility]   Statement to Patients  April 1, 2014      In an effort to ensure the large volume of patient prescription refills is processed in the most efficient and expeditious manner, we are asking our patients to assist us by calling your Pharmacy for all prescription refills, this will include also your  Mail Order Pharmacy. The pharmacy will contact our office electronically to continue the refill process. Please do not wait until the last minute to call your pharmacy. We need at least 48 hours (2days) to fill prescriptions. We also encourage you to call your pharmacy before going to  your prescription to make sure it is ready. With regard to controlled substance prescription refill requests (narcotic refills) that need to be picked up at our office, we ask your cooperation by providing us with at least 72 hours (3days) notice that you will need a refill.     We will not refill narcotic prescription refill requests after 4:00pm on any weekday, Monday through Thursday, or after 2:00pm on Fridays, or on the weekends. We encourage everyone to explore another way of getting your prescription refill request processed using [employer], our patient web portal through our electronic medical record system. Twist is an efficient and effective way to communicate your medication request directly to the office and  downloadable as an clayton on your smart phone . Twist also features a review functionality that allows you to view your medication list as well as leave messages for your physician. Are you ready to get connected? If so please review the attatched instructions or speak to any of our staff to get you set up right away! Thank you so much for your cooperation. Should you have any questions please contact our Practice Administrator. no

## 2022-04-30 DIAGNOSIS — I10 ESSENTIAL HYPERTENSION: ICD-10-CM

## 2022-05-02 RX ORDER — LISINOPRIL 10 MG/1
TABLET ORAL
Qty: 90 TABLET | Refills: 3 | Status: SHIPPED | OUTPATIENT
Start: 2022-05-02

## 2022-06-10 ENCOUNTER — TELEPHONE (OUTPATIENT)
Dept: FAMILY MEDICINE CLINIC | Age: 67
End: 2022-06-10

## 2022-06-10 ENCOUNTER — NURSE TRIAGE (OUTPATIENT)
Dept: OTHER | Facility: CLINIC | Age: 67
End: 2022-06-10

## 2022-06-10 NOTE — TELEPHONE ENCOUNTER
Called patient to schedule her for an appointment that she requested. She answered and thought I was Dr. Anthony Meza, I informed her that I was not and that I was calling to get her scheduled for her physical with you she then said \"I want to speak with Dr. Nikolas Kaur" and hung up on me.

## 2022-06-10 NOTE — TELEPHONE ENCOUNTER
----- Message from Natalie Alvarado sent at 6/10/2022 10:43 AM EDT -----  Subject: Appointment Request    Reason for Call: Routine Medicare AWV    QUESTIONS  Type of Appointment? Established Patient  Reason for appointment request? Available appointments did not meet   patient need  Additional Information for Provider? Pt called to schedule a physical, no   appts available. She has no preference as to a certain day of the week or   time she just wants to be seen as soon as possible. Call pt to schedule   physical.  ---------------------------------------------------------------------------  --------------  CALL BACK INFO  What is the best way for the office to contact you? OK to leave message on   voicemail  Preferred Call Back Phone Number? [phone]  ---------------------------------------------------------------------------  --------------  SCRIPT ANSWERS  Relationship to Patient? Self  (If the patient has Medicare as their primary insurance coverage ask this   question) Are you requesting a Medicare Annual Wellness Visit? Yes   (Is the patient requesting a pap smear with their physical exam?)? No  (Is the patient requesting their annual physical and does not need PAP or   AWV per above?)? No  Have you been diagnosed with COVID-19 in the past 10 days? No  (Service Expert  click yes below to proceed with [employer] As Usual   Scheduling)?  Yes

## 2022-06-10 NOTE — TELEPHONE ENCOUNTER
Patient called office back to schedule appointment and was transferred to me. I informed patient that I will schedule her for 6/30 at 3pm for a visit and she declined. Stated she wants an appointment for tomorrow, I informed her that we are not open on Saturday's and that Dr. Albina Tabor is also on hospital service currently and her first available is the 6/30 appointment. Patient misunderstood my statement about Dr. Albina Tabor being on hospital service and thought I was telling her she needs to go to the hospital. I tried to speak as clear and slow as possible to avoid a misunderstanding however patient was then upset at the fact that I was breathing. Patient informed me that she will be at our office on Monday to see Dr. Albina Tabor and if she is not here she will go to the hospital to find her. Patient briefly stated she really wants to be seen for ankle pain and stated she dropped something from the freezer on her ankle. She stated her ankle is swollen but when I recommended she go to the emergency room she told me it \"wasn't that serious\". Patient rambled for 45-50 minutes slurring her words for the entirety of the conversation about several things, apologized for some of the language she used and words she called me but then told me she was actually not sorry. In the end, patient declined the appointment, declined having her MyChart set up and declined going to the emergency room for her ankle.

## 2022-06-10 NOTE — TELEPHONE ENCOUNTER
Received call from Vinita  at [facility] with Red Flag Complaint. Subjective: Caller states \" Pt called in stating that she dropped a frozen piece of meat on her foot and injured it. \"     Pt refused assessment and stated she did not want to speak to writer and wanted to speak to the office. NO triage completed. Pt sounds intoxicated. Current Symptoms: Foot injury     Onset: 1 week ago; worsening     Writer attempted to transfer the pt x2 to the office and was unable to reach. Writer called [facility] and was going to attempted to get to office that way but pt disconnected. Attention Provider: Thank you for allowing me to participate in the care of your patient. The patient was connected to triage in response to information provided to the [facility]. Please do not respond through this encounter as the response is not directed to a shared pool.

## 2022-10-03 DIAGNOSIS — E03.4 HYPOTHYROIDISM DUE TO ACQUIRED ATROPHY OF THYROID: ICD-10-CM

## 2022-10-03 RX ORDER — LEVOTHYROXINE SODIUM 150 MCG
TABLET ORAL
Qty: 96 TABLET | Refills: 0 | Status: SHIPPED | OUTPATIENT
Start: 2022-10-03

## 2022-10-24 ENCOUNTER — OFFICE VISIT (OUTPATIENT)
Dept: FAMILY MEDICINE CLINIC | Age: 67
End: 2022-10-24
Payer: MEDICARE

## 2022-10-24 VITALS
BODY MASS INDEX: 21.33 KG/M2 | HEART RATE: 84 BPM | DIASTOLIC BLOOD PRESSURE: 85 MMHG | SYSTOLIC BLOOD PRESSURE: 132 MMHG | WEIGHT: 149 LBS | RESPIRATION RATE: 18 BRPM | OXYGEN SATURATION: 98 % | TEMPERATURE: 98.1 F | HEIGHT: 70 IN

## 2022-10-24 DIAGNOSIS — Z12.31 ENCOUNTER FOR SCREENING MAMMOGRAM FOR MALIGNANT NEOPLASM OF BREAST: ICD-10-CM

## 2022-10-24 DIAGNOSIS — F17.210 CIGARETTE NICOTINE DEPENDENCE WITHOUT COMPLICATION: ICD-10-CM

## 2022-10-24 DIAGNOSIS — R73.09 ELEVATED HEMOGLOBIN A1C: ICD-10-CM

## 2022-10-24 DIAGNOSIS — Z00.00 MEDICARE ANNUAL WELLNESS VISIT, SUBSEQUENT: ICD-10-CM

## 2022-10-24 DIAGNOSIS — E78.2 MIXED HYPERLIPIDEMIA: ICD-10-CM

## 2022-10-24 DIAGNOSIS — R79.89 LOW VITAMIN D LEVEL: ICD-10-CM

## 2022-10-24 DIAGNOSIS — M79.7 FIBROMYALGIA: ICD-10-CM

## 2022-10-24 DIAGNOSIS — Z12.11 SCREEN FOR COLON CANCER: ICD-10-CM

## 2022-10-24 DIAGNOSIS — F10.982 ALCOHOL-INDUCED INSOMNIA (HCC): ICD-10-CM

## 2022-10-24 DIAGNOSIS — E55.9 VITAMIN D DEFICIENCY: ICD-10-CM

## 2022-10-24 DIAGNOSIS — I73.9 PVD (PERIPHERAL VASCULAR DISEASE) (HCC): ICD-10-CM

## 2022-10-24 DIAGNOSIS — I10 ESSENTIAL HYPERTENSION: Primary | ICD-10-CM

## 2022-10-24 DIAGNOSIS — F17.200 SMOKER: ICD-10-CM

## 2022-10-24 DIAGNOSIS — E03.4 HYPOTHYROIDISM DUE TO ACQUIRED ATROPHY OF THYROID: ICD-10-CM

## 2022-10-24 PROCEDURE — G8754 DIAS BP LESS 90: HCPCS | Performed by: STUDENT IN AN ORGANIZED HEALTH CARE EDUCATION/TRAINING PROGRAM

## 2022-10-24 PROCEDURE — G9899 SCRN MAM PERF RSLTS DOC: HCPCS | Performed by: STUDENT IN AN ORGANIZED HEALTH CARE EDUCATION/TRAINING PROGRAM

## 2022-10-24 PROCEDURE — G8399 PT W/DXA RESULTS DOCUMENT: HCPCS | Performed by: STUDENT IN AN ORGANIZED HEALTH CARE EDUCATION/TRAINING PROGRAM

## 2022-10-24 PROCEDURE — 3017F COLORECTAL CA SCREEN DOC REV: CPT | Performed by: STUDENT IN AN ORGANIZED HEALTH CARE EDUCATION/TRAINING PROGRAM

## 2022-10-24 PROCEDURE — G8431 POS CLIN DEPRES SCRN F/U DOC: HCPCS | Performed by: STUDENT IN AN ORGANIZED HEALTH CARE EDUCATION/TRAINING PROGRAM

## 2022-10-24 PROCEDURE — G0439 PPPS, SUBSEQ VISIT: HCPCS | Performed by: STUDENT IN AN ORGANIZED HEALTH CARE EDUCATION/TRAINING PROGRAM

## 2022-10-24 PROCEDURE — G8536 NO DOC ELDER MAL SCRN: HCPCS | Performed by: STUDENT IN AN ORGANIZED HEALTH CARE EDUCATION/TRAINING PROGRAM

## 2022-10-24 PROCEDURE — 99214 OFFICE O/P EST MOD 30 MIN: CPT | Performed by: STUDENT IN AN ORGANIZED HEALTH CARE EDUCATION/TRAINING PROGRAM

## 2022-10-24 PROCEDURE — 1101F PT FALLS ASSESS-DOCD LE1/YR: CPT | Performed by: STUDENT IN AN ORGANIZED HEALTH CARE EDUCATION/TRAINING PROGRAM

## 2022-10-24 PROCEDURE — G8427 DOCREV CUR MEDS BY ELIG CLIN: HCPCS | Performed by: STUDENT IN AN ORGANIZED HEALTH CARE EDUCATION/TRAINING PROGRAM

## 2022-10-24 PROCEDURE — G8752 SYS BP LESS 140: HCPCS | Performed by: STUDENT IN AN ORGANIZED HEALTH CARE EDUCATION/TRAINING PROGRAM

## 2022-10-24 PROCEDURE — 1123F ACP DISCUSS/DSCN MKR DOCD: CPT | Performed by: STUDENT IN AN ORGANIZED HEALTH CARE EDUCATION/TRAINING PROGRAM

## 2022-10-24 PROCEDURE — G8420 CALC BMI NORM PARAMETERS: HCPCS | Performed by: STUDENT IN AN ORGANIZED HEALTH CARE EDUCATION/TRAINING PROGRAM

## 2022-10-24 PROCEDURE — 1090F PRES/ABSN URINE INCON ASSESS: CPT | Performed by: STUDENT IN AN ORGANIZED HEALTH CARE EDUCATION/TRAINING PROGRAM

## 2022-10-24 PROCEDURE — 99406 BEHAV CHNG SMOKING 3-10 MIN: CPT | Performed by: STUDENT IN AN ORGANIZED HEALTH CARE EDUCATION/TRAINING PROGRAM

## 2022-10-24 RX ORDER — DM/P-EPHED/ACETAMINOPH/DOXYLAM 30-7.5/3
2 LIQUID (ML) ORAL
Qty: 168 EACH | Refills: 3 | Status: SHIPPED | OUTPATIENT
Start: 2022-10-24
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
## 2022-10-24 NOTE — PATIENT INSTRUCTIONS
Medicare Wellness Visit, Female     The best way to live healthy is to have a lifestyle where you eat a well-balanced diet, exercise regularly, limit alcohol use, and quit all forms of tobacco/nicotine, if applicable. Regular preventive services are another way to keep healthy. Preventive services (vaccines, screening tests, monitoring & exams) can help personalize your care plan, which helps you manage your own care. Screening tests can find health problems at the earliest stages, when they are easiest to treat. [facility] follows the current, evidence-based guidelines published by the [facility] Arya Shepard ([facility]) when recommending preventive services for our patients. Because we follow these guidelines, sometimes recommendations change over time as research supports it. (For example, mammograms used to be recommended annually. Even though Medicare will still pay for an annual mammogram, the newer guidelines recommend a mammogram every two years for women of average risk). Of course, you and your doctor may decide to screen more often for some diseases, based on your risk and your co-morbidities (chronic disease you are already diagnosed with). Preventive services for you include:  - Medicare offers their members a free annual wellness visit, which is time for you and your primary care provider to discuss and plan for your preventive service needs. Take advantage of this benefit every year!  -All adults over the age of 72 should receive the recommended pneumonia vaccines. Current USPSTF guidelines recommend a series of two vaccines for the best pneumonia protection.   -All adults should have a flu vaccine yearly and a tetanus vaccine every 10 years.   -All adults age 48 and older should receive the shingles vaccines (series of two vaccines).       -All adults age 38-68 who are overweight should have a diabetes screening test once every three years.   -All adults born between [birth year] and 1965 should be screened once for Hepatitis C.  -Other screening tests and preventive services for persons with diabetes include: an eye exam to screen for diabetic retinopathy, a kidney function test, a foot exam, and stricter control over your cholesterol.   -Cardiovascular screening for adults with routine risk involves an electrocardiogram (ECG) at intervals determined by your doctor.   -Colorectal cancer screenings should be done for adults age 54-65 with no increased risk factors for colorectal cancer. There are a number of acceptable methods of screening for this type of cancer. Each test has its own benefits and drawbacks. Discuss with your doctor what is most appropriate for you during your annual wellness visit. The different tests include: colonoscopy (considered the best screening method), a fecal occult blood test, a fecal DNA test, and sigmoidoscopy.    -A bone mass density test is recommended when a woman turns 65 to screen for osteoporosis. This test is only recommended one time, as a screening. Some providers will use this same test as a disease monitoring tool if you already have osteoporosis. -Breast cancer screenings are recommended every other year for women of normal risk, age 54-69.  -Cervical cancer screenings for women over age 72 are only recommended with certain risk factors.      Here is a list of your current Health Maintenance items (your personalized list of preventive services) with a due date:  Health Maintenance Due   Topic Date Due    Depresssion Screening  Never done    COVID-19 Vaccine (1) Never done    Pneumococcal Vaccine (1 - PCV) Never done    Shingles Vaccine (1 of 2) Never done    Smoker or Former Smoker - [address]  Never done    Mammogram  09/11/2019    Colorectal Screening  07/24/2020    Hemoglobin A1C    11/17/2021    Cholesterol Test   11/17/2021    Annual Well Visit  Never done    Yearly Flu Vaccine (1) Never done [address], [facility]  Phone: [phone]      Counseling Resources:    [address]  [phone]    [employer] CAPPTURE  [place] [facility] Education Elements Board:  24 hour crisis line: [phone]  Daytime number: [phone]  Psychiatry, counseling, case management, drug/alcohol addiction     [address] (Dr. Luz Manual): FaceUpdate.CAPPTURE.. com/  [address] [facility]. [address], [address]  Phone: [phone])  Fax: [phone]  Office Hours:  Mon: 10:00 am to 4:00 pm  Tue: 8:00 am to 6:00 pm  Wed-Thur: 8:00 am to 7:00 pm     [facility]: [email or web address]/  [place] ([phone]),  [place] ([phone])  [place] ([phone])  [address] ([phone])     [address] [facility]  Addiction/Substance abuse   [place]: [phone]  [place]: [address] [place] [address] Psychotherapy Associates: [facility].CAPPTURE.  [address] [place] [address] , [address] [place] [address] [address]  Ph. [phone] Fax [phone]     Carole Izaguirre: [email or web address]  [employer] ([phone])  [facility] [place] ([phone])

## 2022-10-24 NOTE — LETTER
12/15/2022    Ms. [address] [id number]      Dear Amelia Cheung    I have reviewed your results and have found the results listed below to be within normal ranges. Screening for colon cancer with stool test for occcult blood: NEGATIVE    My recommendations are as follows: Follow up to repeat this test in 1 year. Please call if you have any questions [phone] .     Sincerely,      Adelita Doss MD

## 2022-10-25 LAB
25(OH)D3 SERPL-MCNC: 22.8 NG/ML (ref 30–100)
ANION GAP SERPL CALC-SCNC: 5 MMOL/L (ref 5–15)
APPEARANCE UR: CLEAR
BILIRUB UR QL: NEGATIVE
BUN SERPL-MCNC: 10 MG/DL (ref 6–20)
BUN/CREAT SERPL: 13 (ref 12–20)
CALCIUM SERPL-MCNC: 9.4 MG/DL (ref 8.5–10.1)
CHLORIDE SERPL-SCNC: 93 MMOL/L (ref 97–108)
CHOLEST SERPL-MCNC: 245 MG/DL
CO2 SERPL-SCNC: 31 MMOL/L (ref 21–32)
COLOR UR: NORMAL
CREAT SERPL-MCNC: 0.75 MG/DL (ref 0.55–1.02)
EST. AVERAGE GLUCOSE BLD GHB EST-MCNC: 120 MG/DL
GLUCOSE SERPL-MCNC: 122 MG/DL (ref 65–100)
GLUCOSE UR STRIP.AUTO-MCNC: NEGATIVE MG/DL
HBA1C MFR BLD: 5.8 % (ref 4–5.6)
HDLC SERPL-MCNC: 132 MG/DL
HDLC SERPL: 1.9 {RATIO} (ref 0–5)
HGB UR QL STRIP: NEGATIVE
KETONES UR QL STRIP.AUTO: NEGATIVE MG/DL
LDLC SERPL CALC-MCNC: 98 MG/DL (ref 0–100)
LEUKOCYTE ESTERASE UR QL STRIP.AUTO: NEGATIVE
NITRITE UR QL STRIP.AUTO: NEGATIVE
PH UR STRIP: 6.5 [PH] (ref 5–8)
POTASSIUM SERPL-SCNC: 4.6 MMOL/L (ref 3.5–5.1)
PROT UR STRIP-MCNC: NEGATIVE MG/DL
SODIUM SERPL-SCNC: 129 MMOL/L (ref 136–145)
SP GR UR REFRACTOMETRY: 1.01 (ref 1–1.03)
TRIGL SERPL-MCNC: 75 MG/DL (ref ?–150)
TSH SERPL DL<=0.05 MIU/L-ACNC: 4.04 UIU/ML (ref 0.36–3.74)
UR CULT HOLD, URHOLD: NORMAL
UROBILINOGEN UR QL STRIP.AUTO: 0.2 EU/DL (ref 0.2–1)
VLDLC SERPL CALC-MCNC: 15 MG/DL

## 2022-10-26 DIAGNOSIS — E87.1 LOW SODIUM LEVELS: ICD-10-CM

## 2022-10-26 DIAGNOSIS — E03.4 HYPOTHYROIDISM DUE TO ACQUIRED ATROPHY OF THYROID: Primary | ICD-10-CM

## 2022-10-26 NOTE — PROGRESS NOTES
A1c 5.8% pre-diabetes  Vitamin D remains low, refilled higher rx at visit  TSH high,   Good HDL, LDL  BMP low sodium needs repeat    Called pt to discuss  pt taking levothyroxine but after her coffee, will not adjust dose but discussed taking 30 minutes prior  Recheck sodium and tsh in 4 weeks  Discussed fiber and prevention of type 2 dm

## 2023-01-24 ENCOUNTER — TELEPHONE (OUTPATIENT)
Dept: FAMILY MEDICINE CLINIC | Age: 68
End: 2023-01-24

## 2023-01-24 NOTE — TELEPHONE ENCOUNTER
Pt called requesting to speak with you regarding information that was provided during her LOV 10/24/22. She would provide any specific information. When asked to elaborate, she continued to state that you would know. Pt's call back number is [phone].     Thank you

## 2023-01-25 NOTE — TELEPHONE ENCOUNTER
This writer verified patient by 2 identifiers with patient. Patient inquired on topics that was spoke about during last visit with Dr Deonte Byers and lifestyle medication. Patient states she did not return for lifestyle medication appointment due to insurance changing. Patient states that she would like to speak to provider about the things she need to do and her insurance is willing to give funds to patient to assist with those changes. This writer informed patient that I will notify the  to follow up with her to schedule an appointment for lifestyle medicine with Dr Deonte Byers. Patient agreed to that plan and awaiting telephone call.

## 2023-01-27 ENCOUNTER — OFFICE VISIT (OUTPATIENT)
Dept: FAMILY MEDICINE CLINIC | Age: 68
End: 2023-01-27
Payer: MEDICARE

## 2023-01-27 VITALS
DIASTOLIC BLOOD PRESSURE: 88 MMHG | WEIGHT: 152.8 LBS | BODY MASS INDEX: 21.92 KG/M2 | OXYGEN SATURATION: 97 % | SYSTOLIC BLOOD PRESSURE: 147 MMHG | HEART RATE: 78 BPM | TEMPERATURE: 97.9 F

## 2023-01-27 DIAGNOSIS — Z91.81 PERSONAL HISTORY OF FALL: ICD-10-CM

## 2023-01-27 DIAGNOSIS — I73.9 PVD (PERIPHERAL VASCULAR DISEASE) (HCC): ICD-10-CM

## 2023-01-27 DIAGNOSIS — Z72.0 TOBACCO USE: ICD-10-CM

## 2023-01-27 DIAGNOSIS — I10 ESSENTIAL HYPERTENSION: ICD-10-CM

## 2023-01-27 DIAGNOSIS — R07.89 OTHER CHEST PAIN: ICD-10-CM

## 2023-01-27 DIAGNOSIS — E03.9 ACQUIRED HYPOTHYROIDISM: Primary | ICD-10-CM

## 2023-01-27 DIAGNOSIS — E03.4 HYPOTHYROIDISM DUE TO ACQUIRED ATROPHY OF THYROID: ICD-10-CM

## 2023-01-27 DIAGNOSIS — I44.7 NEW ONSET LEFT BUNDLE BRANCH BLOCK (LBBB): ICD-10-CM

## 2023-01-27 DIAGNOSIS — E55.9 VITAMIN D DEFICIENCY: ICD-10-CM

## 2023-01-27 DIAGNOSIS — R20.2 NUMBNESS AND TINGLING OF BOTH FEET: ICD-10-CM

## 2023-01-27 DIAGNOSIS — F12.90 MARIJUANA USE: ICD-10-CM

## 2023-01-27 DIAGNOSIS — R20.0 NUMBNESS AND TINGLING OF BOTH FEET: ICD-10-CM

## 2023-01-27 RX ORDER — LEVOTHYROXINE SODIUM 150 UG/1
150 TABLET ORAL
Qty: 30 TABLET | Refills: 0 | Status: SHIPPED | OUTPATIENT
Start: 2023-01-27

## 2023-01-27 RX ORDER — ACETAMINOPHEN 500 MG
1 TABLET ORAL DAILY
Qty: 1 KIT | Refills: 0 | Status: SHIPPED | OUTPATIENT
Start: 2023-01-27

## 2023-01-27 RX ORDER — ASPIRIN 81 MG/1
81 TABLET ORAL DAILY
Qty: 90 TABLET | Refills: 3 | Status: SHIPPED | OUTPATIENT
Start: 2023-01-27

## 2023-01-27 RX ORDER — IBUPROFEN 200 MG
1 TABLET ORAL EVERY 24 HOURS
Qty: 45 PATCH | Refills: 0 | Status: SHIPPED | OUTPATIENT
Start: 2023-01-27 | End: 2023-03-13

## 2023-01-27 RX ORDER — NICOTINE 7MG/24HR
1 PATCH, TRANSDERMAL 24 HOURS TRANSDERMAL EVERY 24 HOURS
Qty: 15 PATCH | Refills: 0 | Status: SHIPPED | OUTPATIENT
Start: 2023-03-28 | End: 2023-04-12

## 2023-01-27 RX ORDER — LISINOPRIL 10 MG/1
10 TABLET ORAL DAILY
Qty: 90 TABLET | Refills: 3 | Status: SHIPPED | OUTPATIENT
Start: 2023-01-27

## 2023-01-27 RX ORDER — ASPIRIN 81 MG/1
81 TABLET ORAL DAILY
Qty: 90 TABLET | Refills: 0 | Status: SHIPPED | OUTPATIENT
Start: 2023-01-27 | End: 2023-01-27

## 2023-01-27 RX ORDER — IBUPROFEN 200 MG
1 TABLET ORAL EVERY 24 HOURS
Qty: 15 PATCH | Refills: 0 | Status: SHIPPED | OUTPATIENT
Start: 2023-03-13 | End: 2023-03-28

## 2023-01-27 RX ORDER — LEVOTHYROXINE SODIUM 150 MCG
TABLET ORAL
Qty: 96 TABLET | Refills: 0 | Status: SHIPPED | OUTPATIENT
Start: 2023-01-27

## 2023-01-27 RX ORDER — ERGOCALCIFEROL 1.25 MG/1
50000 CAPSULE ORAL
Qty: 13 CAPSULE | Refills: 3 | Status: SHIPPED | OUTPATIENT
Start: 2023-01-27

## 2023-01-27 NOTE — PROGRESS NOTES
Addendum:  Called patient, confirmed medications and pharmacy. Synthroid to [facility] for 30 days since she is almost out. Remainder of prescriptions sent to [facility].

## 2023-01-27 NOTE — PROGRESS NOTES
Identified pt with two pt identifiers(name and ). Reviewed record in preparation for visit and have obtained necessary documentation. All patient medications has been reviewed. Chief Complaint   Patient presents with    New Patient     Lifestyle medicine-  intermittent HA x 3 months    SOB - started 2 months ago, c/o chest pain sometimes    Bruising - on hands     Vitals:    23 0925 23 0928   BP: (!) 154/93 (!) 147/88   Pulse: 79 78   Temp: 97.9 °F (36.6 °C)    TempSrc: Oral    SpO2: 97%    Weight: 152 lb 12.8 oz (69.3 kg)        1. Have you been to the ER, urgent care clinic since your last visit? Hospitalized since your last visit? No    2. Have you seen or consulted any other health care providers outside of the [facility] since your last visit? Include any pap smears or colon screening.  No

## 2023-01-27 NOTE — LETTER
1/27/2023 10:12 AM    Ms. Salinashidickson Kamara  [address] [facility] [id number]              Sincerely,      Chin Henderson MD

## 2023-01-27 NOTE — LETTER
NOTIFICATION RETURN TO WORK / SCHOOL    1/27/2023 10:13 AM    Ms. Roger Kamara  [place] [address] [address]      To Whom It May Concern:    Roger Kamara [facility] She has high blood pressure and high cholesterol. To help treat these diseases, I recommend consuming at least 5 servings of fruits and vegetables each day. If there are questions or concerns please have the patient contact our office.         Sincerely,      Chin Henderson MD

## 2023-01-27 NOTE — PROGRESS NOTES
Maricruz Garcia is an 70 y.o. female with history of essential hypertension, hypothyroidism, prediabetes and current tobacco use who presents for follow up of chronic conditions and new requests from changing insurance    #Tobacco use  - interested in patches  - has dentures so the gum doesn't work   - goes through phases  - 1 pack a day  - would like to try patches  - thinks this makes her short of breath    #THC  - smoked and it made her feel differently  - worried about getting this off the street  - wants a marijuana card   - would like this for sleep    #Hypertension  The patient is here for f/u of HTN. The goal blood pressure is <130/80. The patient is currently taking the following medications: lisinopril  The patient does note check BP at home. The patient denies dizziness and other side effects from the current BP medications. - needs blood pressure cuff  - reports she is trying to eat more fruits and vegetables  - steamed spinach  - eating lots of beans     #thyroid  - on synthroid 150mcg   - last check was abnormal but she was not taking this properly  - reports that she is now taking this before she eats     #fibromyalgia  #neuropathy  #afraid of falling  - reports history of falls and knees buckle  - history of peripheral vascular disease  - using THC for pain   - researched an exercise machine that can help her move her legs without falling    #chest pain  At the end of the visit pt mentions chest pain with shortness of breath walking up the stairs. No current chest pain. Ongoing for several months  It is right sided  Associated with SOB but no other symptoms  No fever or cough  Thinks it is related to smoking      Allergies   Allergies   Allergen Reactions    Penicillins Other (comments)     Yeast infection         Medications   Current Outpatient Medications   Medication Sig    Blood Pressure Monitor kit 1 Applicator by Does Not Apply route daily.     Synthroid 150 mcg tablet TAKE 1 TABLET BY MOUTH EVERY MORNING BEFORE BREAKFAST    lisinopriL (PRINIVIL, ZESTRIL) 10 mg tablet TAKE 1 TABLET BY MOUTH EVERY DAY     No current facility-administered medications for this visit. Past surgical, medical and social history reviewed and updated as relevant to presenting concerns. ROS: See HPI      OBJECTIVE  Visit Vitals  BP (!) 147/88   Pulse 78   Temp 97.9 °F (36.6 °C) (Oral)   Wt 152 lb 12.8 oz (69.3 kg)   SpO2 97%   BMI 21.92 kg/m²       Physical Exam  General appearance - alert, well appearing, and in no distress  Eyes - sclera anicteric  Chest - clear to auscultation, no wheezes, rales or rhonchi, symmetric air entry  Heart - normal rate, regular rhythm, normal S1, S2, no murmurs, rubs, clicks or gallops  Neurological - alert, oriented, normal speech, no focal findings or movement disorder noted  Musculoskeletal - antalgic gait, uses cane  Extremities - peripheral pulses normal, no pedal edema, no clubbing or cyanosis  Skin - normal coloration and turgor, no rashes, no suspicious skin lesions noted        ASSESSMENT & PLAN    #Other chest pain  #Left Bundle Branch Block  #PVD (peripheral vascular disease) (HCC)  Given comorbidity, likely some CAD. Stable angina most likely dx from history, despite pain being right sided. Ddx includes COPD, MSK pain. Not taking statin prescribed after visit in the fall; but LDL 81 at the time. EKG in the office with new LBBB, last EKG from 2015. Referral sent for cardiology evaluation, and sent message after viewing EKG results and have asked coordinator to process the referral quickly. Discussed with patient and provided in writing that if chest pain occurs again she should go to the ER. She expressed understanding.   - REFERRAL TO CARDIOLOGY  - AMB POC EKG ROUTINE W/ 12 LEADS, INTER & REP  - AMB SUPPLY ORDER  - REFERRAL TO PHYSICAL THERAPY    #Acquired hypothyroidism  Taking medication correctly. Due for labs.  Not sure which pharmacy she wants, pt will call back.   - TSH 3RD GENERATION; Future    #Essential hypertension  Not at goal. Wrote script for BP cuff. Follow up in a few weeks with home measurements. - METABOLIC PANEL, BASIC; Future  - Blood Pressure Monitor kit; 1 Applicator by Does Not Apply route daily. Dispense: 1 Kit; Refill: 0  - CBC W/O DIFF; Future    #Numbness and tingling of both feet  #Personal history of fall  Given history of fall and need to work out to improve PVD and neuropathy and prevent falls, rx provided for Legexercise and physical therapy.   - REFERRAL TO PHYSICAL THERAPY  - AMB SUPPLY ORDER  - REFERRAL TO PHYSICAL THERAPY    #Tobacco use  Patient is motivated to quit smoking. We discussed options, including nicotine replacement therapy, medications (such as  buproprion, varenicline). Pt would like to try patches. She will call back with pharmacy where these should be sent. Nicotine patch: Apply daily to clean, dry, hairless skin;   start with  21 mg if 11-20cpd 4-6 weeks  Titrate down by 7mg q2w as able after this. - REFERRAL TO CARDIOLOGY    #Marijuana use  Noting. Recommend talking with licensed provider. #Vitamin D deficiency  Would like 00357 unit tabs. Will send in once pharmacy confirmed by patient. Follow-up and Dispositions    Return in about 4 weeks (around 2/24/2023) for Blood pressure check. Patient encounter was at > 40 minutes of total time spend on the date of the encounter: preparing to see the patient(reviewing prior notes and tests), obtaining and/or reviewing separately obtained history, performing a medially appropriate examination and/or evaluation, counseling and educating the patient/family/caregiver, ordering medications, tests or procedures, documenting clinical information in the electronic or other health record, independently interpreting results(not separately reported) and communicating results to the patient/family/caregiver and care coordination(not separately reported). I have discussed the diagnosis with the patient and the intended plan as seen in the above orders. Patient verbalized understanding of the plan and agrees with the plan. The patient has received an after-visit summary and questions were answered concerning future plans. I have discussed medication side effects and warnings with the patient as well. Informed patient to return to the office if new symptoms arise.         Kraig Giang MD

## 2023-01-27 NOTE — TELEPHONE ENCOUNTER
The pharmacy sent in a fax  for refills of Synthroid and Lisinopril. She has an appointment today with you. Thanks!

## 2023-01-27 NOTE — PROGRESS NOTES
Sinus. LAD. New LBBB. Asymptomatic in office, obtained to get baseline because pt describing stable angina. Results discussed in office with pt.

## 2023-01-28 LAB
BUN SERPL-MCNC: 10 MG/DL (ref 8–27)
BUN/CREAT SERPL: 14 (ref 12–28)
CALCIUM SERPL-MCNC: 9.5 MG/DL (ref 8.7–10.3)
CHLORIDE SERPL-SCNC: 100 MMOL/L (ref 96–106)
CO2 SERPL-SCNC: 24 MMOL/L (ref 20–29)
CREAT SERPL-MCNC: 0.74 MG/DL (ref 0.57–1)
EGFR: 88 ML/MIN/1.73
ERYTHROCYTE [DISTWIDTH] IN BLOOD BY AUTOMATED COUNT: 12.7 % (ref 11.7–15.4)
GLUCOSE SERPL-MCNC: 100 MG/DL (ref 70–99)
HCT VFR BLD AUTO: 42.7 % (ref 34–46.6)
HGB BLD-MCNC: 15 G/DL (ref 11.1–15.9)
MCH RBC QN AUTO: 33.4 PG (ref 26.6–33)
MCHC RBC AUTO-ENTMCNC: 35.1 G/DL (ref 31.5–35.7)
MCV RBC AUTO: 95 FL (ref 79–97)
PLATELET # BLD AUTO: 239 X10E3/UL (ref 150–450)
POTASSIUM SERPL-SCNC: 4.3 MMOL/L (ref 3.5–5.2)
RBC # BLD AUTO: 4.49 X10E6/UL (ref 3.77–5.28)
SODIUM SERPL-SCNC: 138 MMOL/L (ref 134–144)
TSH SERPL DL<=0.005 MIU/L-ACNC: 0.85 UIU/ML (ref 0.45–4.5)
WBC # BLD AUTO: 3.7 X10E3/UL (ref 3.4–10.8)

## 2023-01-30 ENCOUNTER — TRANSCRIBE ORDER (OUTPATIENT)
Dept: SCHEDULING | Age: 68
End: 2023-01-30

## 2023-01-30 ENCOUNTER — TELEPHONE (OUTPATIENT)
Dept: FAMILY MEDICINE CLINIC | Age: 68
End: 2023-01-30

## 2023-01-30 DIAGNOSIS — Z72.0 TOBACCO USE: ICD-10-CM

## 2023-01-30 DIAGNOSIS — Z12.31 VISIT FOR SCREENING MAMMOGRAM: Primary | ICD-10-CM

## 2023-01-30 RX ORDER — NICOTINE POLACRILEX 4 MG/1
4 LOZENGE ORAL AS NEEDED
Qty: 30 EACH | Refills: 3 | Status: SHIPPED | OUTPATIENT
Start: 2023-01-30 | End: 2023-02-02

## 2023-01-30 RX ORDER — IBUPROFEN 200 MG
1 TABLET ORAL EVERY 24 HOURS
Qty: 45 PATCH | Refills: 0 | Status: SHIPPED | OUTPATIENT
Start: 2023-01-30 | End: 2023-03-16

## 2023-01-30 NOTE — TELEPHONE ENCOUNTER
Called patient. Provided Central Scheduling [phone] to set up the appointment for the mammogram.     Confirmed she is taking aspirin. Requesting patches and lozenges sent to [facility] [facility] so she can start quitting tobacco use now and not wait for mail order pharmacy.

## 2023-01-30 NOTE — TELEPHONE ENCOUNTER
Pt stated she was returning a phone call made to her. She stated no one left her a voice message, but believe you called her to discuss lab results. Pt requested a returned phone call, if you were attempting to contact her. Her contact number is [phone].     Thank you

## 2023-02-02 ENCOUNTER — OFFICE VISIT (OUTPATIENT)
Facility: CLINIC | Age: 68
End: 2023-02-02
Payer: MEDICARE

## 2023-02-02 ENCOUNTER — TELEPHONE (OUTPATIENT)
Dept: FAMILY MEDICINE CLINIC | Age: 68
End: 2023-02-02

## 2023-02-02 VITALS
WEIGHT: 152 LBS | BODY MASS INDEX: 21.76 KG/M2 | OXYGEN SATURATION: 98 % | DIASTOLIC BLOOD PRESSURE: 80 MMHG | SYSTOLIC BLOOD PRESSURE: 126 MMHG | RESPIRATION RATE: 18 BRPM | HEIGHT: 70 IN | HEART RATE: 86 BPM

## 2023-02-02 DIAGNOSIS — F17.200 SMOKER: ICD-10-CM

## 2023-02-02 DIAGNOSIS — R07.9 CHEST PAIN, UNSPECIFIED TYPE: ICD-10-CM

## 2023-02-02 DIAGNOSIS — F10.10 ALCOHOL ABUSE: ICD-10-CM

## 2023-02-02 DIAGNOSIS — I44.7 NEW ONSET LEFT BUNDLE BRANCH BLOCK (LBBB): ICD-10-CM

## 2023-02-02 DIAGNOSIS — E78.5 HYPERLIPIDEMIA, UNSPECIFIED HYPERLIPIDEMIA TYPE: ICD-10-CM

## 2023-02-02 DIAGNOSIS — R73.09 ELEVATED HEMOGLOBIN A1C: Primary | ICD-10-CM

## 2023-02-02 DIAGNOSIS — R01.1 MURMUR: ICD-10-CM

## 2023-02-02 PROCEDURE — 1090F PRES/ABSN URINE INCON ASSESS: CPT | Performed by: INTERNAL MEDICINE

## 2023-02-02 PROCEDURE — G8432 DEP SCR NOT DOC, RNG: HCPCS | Performed by: INTERNAL MEDICINE

## 2023-02-02 PROCEDURE — 99204 OFFICE O/P NEW MOD 45 MIN: CPT | Performed by: INTERNAL MEDICINE

## 2023-02-02 PROCEDURE — G8399 PT W/DXA RESULTS DOCUMENT: HCPCS | Performed by: INTERNAL MEDICINE

## 2023-02-02 PROCEDURE — 3074F SYST BP LT 130 MM HG: CPT | Performed by: INTERNAL MEDICINE

## 2023-02-02 PROCEDURE — 3017F COLORECTAL CA SCREEN DOC REV: CPT | Performed by: INTERNAL MEDICINE

## 2023-02-02 PROCEDURE — G9899 SCRN MAM PERF RSLTS DOC: HCPCS | Performed by: INTERNAL MEDICINE

## 2023-02-02 PROCEDURE — 1101F PT FALLS ASSESS-DOCD LE1/YR: CPT | Performed by: INTERNAL MEDICINE

## 2023-02-02 PROCEDURE — G8536 NO DOC ELDER MAL SCRN: HCPCS | Performed by: INTERNAL MEDICINE

## 2023-02-02 PROCEDURE — 3079F DIAST BP 80-89 MM HG: CPT | Performed by: INTERNAL MEDICINE

## 2023-02-02 PROCEDURE — G8427 DOCREV CUR MEDS BY ELIG CLIN: HCPCS | Performed by: INTERNAL MEDICINE

## 2023-02-02 PROCEDURE — 1123F ACP DISCUSS/DSCN MKR DOCD: CPT | Performed by: INTERNAL MEDICINE

## 2023-02-02 PROCEDURE — G8420 CALC BMI NORM PARAMETERS: HCPCS | Performed by: INTERNAL MEDICINE

## 2023-02-02 NOTE — PROGRESS NOTES
Per Dr. Maria R simmons nuclear stress, echo and follow up. [place] [facility] [place] [place].
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
## 2023-02-02 NOTE — PATIENT INSTRUCTIONS
You will be scheduled for a Nuclear Stress Test after your appointment today. Nuclear stress testing evaluates blood flow to your heart muscle and assesses cardiac function. There are 2 parts (Rest/Stress) to this procedure and will include either an exercise on a treadmill or an IV administration of a stressing medication called Lexiscan. Your cardiologist will determine which type of testing is best for you. This test can be performed in one day unless it is determined that better quality images will be obtained by performing the test over two days. *Please arrive 15 minutes prior to your appointment time    Test Duration:    -One day testing will take 4 hours     Day of testing instructions:    NO CAFFEINE (not even decaffeinated products) 24 HOURS PRIOR TO TESTING. This includes coffee, soda, tea, chocolate, multivitamins, and migraine medication, like Excedrin or Fioricet that contains caffeine. Nothing to eat or drink 4 HOURS prior to testing  NO NICOTINE 12 hours prior to testing  Hold any medications requested by your cardiologist. Otherwise take medications as directed with a few sips of water. If you are unsure you may bring your medications with you to take after instructed by your stressing nurse. It is recommended you hold none of your medications prior to your test. DIABETIC PATIENTS: Take half of your insulin with a light meal 4 hours before your test.  Wear comfortable clothes and shoes (Shirts with no metal, shorts or pants, tennis shoes, no heels or flip flops)    IMPORTANT: This testing involves a cardiac tracer ordered specifically for you. If you are unable to make your appointment, please call to cancel/reschedule AT LEAST 24 hours prior to your appointment so your tracer can be cancelled. [phone].

## 2023-02-03 NOTE — TELEPHONE ENCOUNTER
Called patient to schedule a bp check and she declined. She stated that Dr. Vance Fritz said her BP was good.

## 2023-02-06 ENCOUNTER — TELEPHONE (OUTPATIENT)
Dept: FAMILY MEDICINE CLINIC | Age: 68
End: 2023-02-06

## 2023-02-06 NOTE — TELEPHONE ENCOUNTER
Patient called stating that she wanted to know if she could take Metamucil, being that she is on other medications and has some health conditions. She also stated that she wanted to discuss some health concerns she was having. Would like to be contacted at your earliest convenience by you specifically Dr. Nhan Lawrence. Thanks!

## 2023-02-08 NOTE — TELEPHONE ENCOUNTER
This writer contacted the patient, two patient identifiers verified with patient. Patient states she read the label to consult with physician prior to starting metamucil. . Patient states she wants to ensure it is safe to take Metamucil 3 in 1 with current medication regimen. Patient was advised per physician it is fine to take metamucil with current medication regimen. Patient verbalized understanding. Patient is requesting an appointment with Dr. Silvia Machuca prior to schedule stress test on 02/24/2023. Patient was advised this writer is unable to schedule appointment, however a message will be forwarded to scheduling to assist. Patient verbalized understanding and denies further questions or concerns.

## 2023-02-14 ENCOUNTER — TELEPHONE (OUTPATIENT)
Dept: FAMILY MEDICINE CLINIC | Age: 68
End: 2023-02-14

## 2023-02-14 NOTE — TELEPHONE ENCOUNTER
Called patient to schedule an appointment, but there was no answer. I left a vm with a call back number for her to schedule.

## 2023-02-18 ENCOUNTER — TELEPHONE (OUTPATIENT)
Dept: FAMILY MEDICINE CLINIC | Age: 68
End: 2023-02-18

## 2023-02-18 NOTE — TELEPHONE ENCOUNTER
Returned phone call from the Answering Service. Pt complaining of right sided headache, intermittent, 7/10 in intensity, no radiation, dull-type, started yesterday. No triggers. She does not suffer of HA at all. NO tearing. No N/V, no photophobia or phonophobia. Has not tried anything. It is NOT worse HA of her life. Also mentioned having some dizziness since yesterday, intermittent. No syncope. No new meds. No recent travel. No UR sx. No UTI sx. No SOB, CP or fever. Mentioned good PO intake. I have advised pt to go to ER for further evaluation of headache and dizziness. She is declining. Stated \"I will wait and see if it goes away\". Can try Tylenol as needed. Stroke Sx reviewed with her and ER precautions. Verbalized understanding.

## 2023-02-24 ENCOUNTER — ANCILLARY PROCEDURE (OUTPATIENT)
Dept: CARDIOLOGY CLINIC | Age: 68
End: 2023-02-24

## 2023-02-24 ENCOUNTER — ANCILLARY PROCEDURE (OUTPATIENT)
Dept: CARDIOLOGY CLINIC | Age: 68
End: 2023-02-24
Payer: MEDICARE

## 2023-02-24 VITALS — DIASTOLIC BLOOD PRESSURE: 80 MMHG | SYSTOLIC BLOOD PRESSURE: 126 MMHG

## 2023-02-24 VITALS — BODY MASS INDEX: 21.76 KG/M2 | HEIGHT: 70 IN | WEIGHT: 152 LBS

## 2023-02-24 DIAGNOSIS — I44.7 NEW ONSET LEFT BUNDLE BRANCH BLOCK (LBBB): ICD-10-CM

## 2023-02-24 DIAGNOSIS — R07.9 CHEST PAIN, UNSPECIFIED TYPE: ICD-10-CM

## 2023-02-24 DIAGNOSIS — R73.09 ELEVATED HEMOGLOBIN A1C: ICD-10-CM

## 2023-02-24 DIAGNOSIS — E78.5 HYPERLIPIDEMIA, UNSPECIFIED HYPERLIPIDEMIA TYPE: ICD-10-CM

## 2023-02-24 DIAGNOSIS — R01.1 MURMUR: ICD-10-CM

## 2023-02-24 LAB
ECHO AO ASC DIAM: 3.7 CM
ECHO AO ASCENDING AORTA INDEX: 1.99 CM/M2
ECHO AO ROOT DIAM: 3.1 CM
ECHO AO ROOT INDEX: 1.67 CM/M2
ECHO AV AREA PEAK VELOCITY: 2.3 CM2
ECHO AV AREA VTI: 2.4 CM2
ECHO AV AREA/BSA PEAK VELOCITY: 1.2 CM2/M2
ECHO AV AREA/BSA VTI: 1.3 CM2/M2
ECHO AV MEAN GRADIENT: 8 MMHG
ECHO AV MEAN VELOCITY: 1.3 M/S
ECHO AV PEAK GRADIENT: 15 MMHG
ECHO AV PEAK VELOCITY: 2 M/S
ECHO AV VELOCITY RATIO: 0.65
ECHO AV VTI: 33.5 CM
ECHO EST RA PRESSURE: 3 MMHG
ECHO LA DIAMETER INDEX: 2.26 CM/M2
ECHO LA DIAMETER: 4.2 CM
ECHO LA TO AORTIC ROOT RATIO: 1.35
ECHO LA VOL 2C: 72 ML (ref 22–52)
ECHO LA VOL 4C: 58 ML (ref 22–52)
ECHO LA VOLUME AREA LENGTH: 77 ML
ECHO LA VOLUME INDEX A2C: 39 ML/M2 (ref 16–34)
ECHO LA VOLUME INDEX A4C: 31 ML/M2 (ref 16–34)
ECHO LA VOLUME INDEX AREA LENGTH: 41 ML/M2 (ref 16–34)
ECHO LV E' SEPTAL VELOCITY: 2 CM/S
ECHO LV EDV A2C: 90 ML
ECHO LV EDV A4C: 109 ML
ECHO LV EDV BP: 104 ML (ref 56–104)
ECHO LV EDV INDEX A4C: 59 ML/M2
ECHO LV EDV INDEX BP: 56 ML/M2
ECHO LV EDV NDEX A2C: 48 ML/M2
ECHO LV EJECTION FRACTION A2C: 32 %
ECHO LV EJECTION FRACTION A4C: 47 %
ECHO LV EJECTION FRACTION BIPLANE: 40 % (ref 55–100)
ECHO LV ESV A2C: 61 ML
ECHO LV ESV A4C: 58 ML
ECHO LV ESV BP: 63 ML (ref 19–49)
ECHO LV ESV INDEX A2C: 33 ML/M2
ECHO LV ESV INDEX A4C: 31 ML/M2
ECHO LV ESV INDEX BP: 34 ML/M2
ECHO LV FRACTIONAL SHORTENING: 31 % (ref 28–44)
ECHO LV INTERNAL DIMENSION DIASTOLE INDEX: 2.9 CM/M2
ECHO LV INTERNAL DIMENSION DIASTOLIC: 5.4 CM (ref 3.9–5.3)
ECHO LV INTERNAL DIMENSION SYSTOLIC INDEX: 1.99 CM/M2
ECHO LV INTERNAL DIMENSION SYSTOLIC: 3.7 CM
ECHO LV IVSD: 0.9 CM (ref 0.6–0.9)
ECHO LV MASS 2D: 180.1 G (ref 67–162)
ECHO LV MASS INDEX 2D: 96.8 G/M2 (ref 43–95)
ECHO LV POSTERIOR WALL DIASTOLIC: 0.9 CM (ref 0.6–0.9)
ECHO LV RELATIVE WALL THICKNESS RATIO: 0.33
ECHO LVOT AREA: 3.5 CM2
ECHO LVOT AV VTI INDEX: 0.68
ECHO LVOT DIAM: 2.1 CM
ECHO LVOT MEAN GRADIENT: 4 MMHG
ECHO LVOT PEAK GRADIENT: 6 MMHG
ECHO LVOT PEAK VELOCITY: 1.3 M/S
ECHO LVOT STROKE VOLUME INDEX: 42.4 ML/M2
ECHO LVOT SV: 78.9 ML
ECHO LVOT VTI: 22.8 CM
ECHO MV A VELOCITY: 0.95 M/S
ECHO MV AREA PHT: 2.3 CM2
ECHO MV AREA VTI: 4.2 CM2
ECHO MV E DECELERATION TIME (DT): 331.2 MS
ECHO MV E VELOCITY: 0.47 M/S
ECHO MV E/A RATIO: 0.49
ECHO MV E/E' SEPTAL: 23.5
ECHO MV LVOT VTI INDEX: 0.82
ECHO MV MAX VELOCITY: 1.1 M/S
ECHO MV MEAN GRADIENT: 2 MMHG
ECHO MV MEAN VELOCITY: 0.6 M/S
ECHO MV PEAK GRADIENT: 5 MMHG
ECHO MV PRESSURE HALF TIME (PHT): 96 MS
ECHO MV REGURGITANT ALIASING (NYQUIST) VELOCITY: 37 CM/S
ECHO MV REGURGITANT VELOCITY PISA: 5.7 M/S
ECHO MV REGURGITANT VTIA: 202.5 CM
ECHO MV VTI: 18.7 CM
ECHO RA END SYSTOLIC VOLUME APICAL 4 CHAMBER INDEX BSA: 32 ML/M2
ECHO RA VOLUME: 60 ML
ECHO RIGHT VENTRICULAR SYSTOLIC PRESSURE (RVSP): 25 MMHG
ECHO RV INTERNAL DIMENSION: 4.1 CM
ECHO RV TAPSE: 2.1 CM (ref 1.7–?)
ECHO TV REGURGITANT MAX VELOCITY: 2.33 M/S
ECHO TV REGURGITANT PEAK GRADIENT: 22 MMHG
NUC STRESS EJECTION FRACTION: 51 %
STRESS BASELINE DIAS BP: 80 MMHG
STRESS BASELINE HR: 70 BPM
STRESS BASELINE SYS BP: 126 MMHG
STRESS ESTIMATED WORKLOAD: 1 METS
STRESS EXERCISE DUR MIN: 4 MIN
STRESS EXERCISE DUR SEC: 0 SEC
STRESS PEAK DIAS BP: 82 MMHG
STRESS PEAK SYS BP: 134 MMHG
STRESS PERCENT HR ACHIEVED: 74 %
STRESS POST PEAK HR: 111 BPM
STRESS RATE PRESSURE PRODUCT: NORMAL BPM*MMHG
STRESS TARGET HR: 151 BPM
TID: 1.21

## 2023-02-24 RX ORDER — TETRAKIS(2-METHOXYISOBUTYLISOCYANIDE)COPPER(I) TETRAFLUOROBORATE 1 MG/ML
10 INJECTION, POWDER, LYOPHILIZED, FOR SOLUTION INTRAVENOUS ONCE
Status: COMPLETED | OUTPATIENT
Start: 2023-02-24 | End: 2023-02-24

## 2023-02-24 RX ORDER — TETRAKIS(2-METHOXYISOBUTYLISOCYANIDE)COPPER(I) TETRAFLUOROBORATE 1 MG/ML
30 INJECTION, POWDER, LYOPHILIZED, FOR SOLUTION INTRAVENOUS ONCE
Status: COMPLETED | OUTPATIENT
Start: 2023-02-24 | End: 2023-02-24

## 2023-02-24 RX ADMIN — TETRAKIS(2-METHOXYISOBUTYLISOCYANIDE)COPPER(I) TETRAFLUOROBORATE 26.3 MILLICURIE: 1 INJECTION, POWDER, LYOPHILIZED, FOR SOLUTION INTRAVENOUS at 10:04

## 2023-02-24 RX ADMIN — TETRAKIS(2-METHOXYISOBUTYLISOCYANIDE)COPPER(I) TETRAFLUOROBORATE 8.3 MILLICURIE: 1 INJECTION, POWDER, LYOPHILIZED, FOR SOLUTION INTRAVENOUS at 09:15

## 2023-03-01 ENCOUNTER — OFFICE VISIT (OUTPATIENT)
Dept: FAMILY MEDICINE CLINIC | Age: 68
End: 2023-03-01
Payer: MEDICARE

## 2023-03-01 VITALS
BODY MASS INDEX: 22.05 KG/M2 | DIASTOLIC BLOOD PRESSURE: 80 MMHG | SYSTOLIC BLOOD PRESSURE: 130 MMHG | TEMPERATURE: 98.1 F | HEIGHT: 70 IN | WEIGHT: 154 LBS | HEART RATE: 85 BPM | RESPIRATION RATE: 18 BRPM | OXYGEN SATURATION: 98 %

## 2023-03-01 DIAGNOSIS — I50.32 CHRONIC DIASTOLIC CONGESTIVE HEART FAILURE (HCC): Primary | ICD-10-CM

## 2023-03-01 DIAGNOSIS — F10.10 ALCOHOL ABUSE: ICD-10-CM

## 2023-03-01 DIAGNOSIS — E55.9 VITAMIN D DEFICIENCY: ICD-10-CM

## 2023-03-01 DIAGNOSIS — F10.982 ALCOHOL-INDUCED INSOMNIA (HCC): ICD-10-CM

## 2023-03-01 DIAGNOSIS — I10 ESSENTIAL HYPERTENSION: ICD-10-CM

## 2023-03-01 DIAGNOSIS — F17.200 CURRENT EVERY DAY SMOKER: ICD-10-CM

## 2023-03-01 PROCEDURE — G8536 NO DOC ELDER MAL SCRN: HCPCS | Performed by: FAMILY MEDICINE

## 2023-03-01 PROCEDURE — 1090F PRES/ABSN URINE INCON ASSESS: CPT | Performed by: FAMILY MEDICINE

## 2023-03-01 PROCEDURE — G8432 DEP SCR NOT DOC, RNG: HCPCS | Performed by: FAMILY MEDICINE

## 2023-03-01 PROCEDURE — 3075F SYST BP GE 130 - 139MM HG: CPT | Performed by: FAMILY MEDICINE

## 2023-03-01 PROCEDURE — G8427 DOCREV CUR MEDS BY ELIG CLIN: HCPCS | Performed by: FAMILY MEDICINE

## 2023-03-01 PROCEDURE — 99214 OFFICE O/P EST MOD 30 MIN: CPT | Performed by: FAMILY MEDICINE

## 2023-03-01 PROCEDURE — 3017F COLORECTAL CA SCREEN DOC REV: CPT | Performed by: FAMILY MEDICINE

## 2023-03-01 PROCEDURE — 1123F ACP DISCUSS/DSCN MKR DOCD: CPT | Performed by: FAMILY MEDICINE

## 2023-03-01 PROCEDURE — 1101F PT FALLS ASSESS-DOCD LE1/YR: CPT | Performed by: FAMILY MEDICINE

## 2023-03-01 PROCEDURE — G8420 CALC BMI NORM PARAMETERS: HCPCS | Performed by: FAMILY MEDICINE

## 2023-03-01 PROCEDURE — 3079F DIAST BP 80-89 MM HG: CPT | Performed by: FAMILY MEDICINE

## 2023-03-01 PROCEDURE — G9899 SCRN MAM PERF RSLTS DOC: HCPCS | Performed by: FAMILY MEDICINE

## 2023-03-01 PROCEDURE — G8399 PT W/DXA RESULTS DOCUMENT: HCPCS | Performed by: FAMILY MEDICINE

## 2023-03-01 RX ORDER — ATORVASTATIN CALCIUM 40 MG/1
40 TABLET, FILM COATED ORAL DAILY
Qty: 90 TABLET | Refills: 3 | Status: SHIPPED | OUTPATIENT
Start: 2023-03-01

## 2023-03-01 RX ORDER — SACUBITRIL AND VALSARTAN 24; 26 MG/1; MG/1
1 TABLET, FILM COATED ORAL 2 TIMES DAILY
Qty: 60 TABLET | Refills: 0 | Status: CANCELLED | OUTPATIENT
Start: 2023-03-01 | End: 2023-03-31

## 2023-03-01 RX ORDER — ERGOCALCIFEROL 1.25 MG/1
50000 CAPSULE ORAL
Qty: 12 CAPSULE | Refills: 0 | Status: SHIPPED | OUTPATIENT
Start: 2023-03-01

## 2023-03-01 NOTE — PROGRESS NOTES
*ATTENTION:  This note has been created by a medical student for educational purposes only. Please do not refer to the content of this note for clinical decision-making, billing, or other purposes. Please see attending physicians note to obtain clinical information on this patient. *         [address] [facility] [address] [place] [address]   Office [phone], Fax [phone]      Chief Complaint:     Chief Complaint   Patient presents with    Referral Follow Up     Cardiology Follow Up - Discuss Results          Subjective:   HPI:  Hilda Sanchez is a 70 y.o. female that presents for follow up regarding recent echo (2/24/23)    CHF  Have not heard back yet, follows up on around 3/27 of the month. - no chest pain or SOB, denies decreased appetite  - Echo 2/24/23 showed: Reduced left ventricular systolic function (EF estimated at 40%). Left ventricle size is normal. Normal wall thickness. Global hypokinesis present. Abnormal diastolic function. Mildly dilated left and right atria. Mild regurgitation at aortic and mitral valves. HTN  On lisinopril 10mg daily  - haven't yet started BPs at home because waiting on insurance allowance  - diet: trying to cut down on pork, working on cutting down salt; loves honey, but does not add sugar; doesn't eat much sweets; 1-2 small sodas each day  - exercise: worried about falling, so not doing much exercise; waiting on braces as well as machine to do exercise while sitting    Prediabetes  - denies increased thirst, dysuria, polyuria, increased frequency    Smoking  - smokes ~1 pack per day, trying to quit  - worried about the patch because is not sure how the dosage  - tried wellbutrin in the past but did not work for her; hesitant to try any other medical therapies at the moment    Alcohol use disorder  - no more alcohol for ~40 days      ROS: negative except as above      Past medical history, social history, medications, and allergies personally reviewed.   Past Medical History:   Diagnosis Date    Anxiety     Arthritis     Depression     Dizziness     Falls     HTN (hypertension) 6/25/2010    Hypothyroid 6/25/2010    Thyroid disease     Wrist fracture, left         Social Hx:   Alcohol: none at the moment  Tobacco: 1 pack per day; trying to quit  Illicit drug use: not at the moment, though history positive for various illicit substances    Medications:   Current Outpatient Medications   Medication Sig    Synthroid 150 mcg tablet TAKE 1 TABLET BY MOUTH DAILY BEFORE BREAKFAST    Blood Pressure Monitor kit 1 Applicator by Does Not Apply route daily. lisinopriL (PRINIVIL, ZESTRIL) 10 mg tablet Take 1 Tablet by mouth daily. aspirin delayed-release 81 mg tablet Take 1 Tablet by mouth daily. nicotine (NICODERM CQ) 21 mg/24 hr 1 Patch by TransDERmal route every twenty-four (24) hours for 45 days. (Patient not taking: Reported on 3/1/2023)    ergocalciferol (ERGOCALCIFEROL) 1,250 mcg (50,000 unit) capsule Take 1 Capsule by mouth every seven (7) days. (Patient not taking: Reported on 3/1/2023)    [START ON 3/13/2023] nicotine (NICODERM CQ) 14 mg/24 hr patch 1 Patch by TransDERmal route every twenty-four (24) hours for 15 days. (Patient not taking: Reported on 3/1/2023)     No current facility-administered medications for this visit. Allergies: Allergies   Allergen Reactions    Penicillins Other (comments)     Yeast infection        Health Maintenance:  Health Maintenance Due   Topic Date Due    COVID-19 Vaccine (1) Never done    Pneumococcal 65+ years (1 - PCV) Never done    Shingles Vaccine (1 of 2) Never done    Low dose CT lung screening  Never done    Breast Cancer Screen Mammogram  09/11/2019    Flu Vaccine (1) Never done        Objective:   Vitals reviewed.   Visit Vitals  BP (!) 144/81 (BP 1 Location: Right upper arm, BP Patient Position: Sitting, BP Cuff Size: Adult)   Pulse 94   Temp 98.1 °F (36.7 °C) (Oral)   Resp 18   Ht 5' 10\" (1.778 m)   Wt 154 lb (69.9 kg)   SpO2 98%   BMI 22.10 kg/m²        Physical Exam:  General Alert. No distress. Not diaphoretic. No jaundice, cyanosis, pallor. HENT Head Normocephalic and atraumatic. Eyes Conjunctivae pink, no discharge. No scleral icterus. EOMI. Cardio Normal rate, regular rhythm. No murmur, gallop, or friction rub. No chest wall tenderness. Pulmonary Effort normal. Breath sounds normal. No respiratory distress. No wheezes, rales, or rhonchi. No Stridor. Abdominal Soft. Bowel sounds normal. No distension. No tenderness.  Deferred. Extremities No edema of lower extremities. No tenderness. Neurological No focal deficits. Skin Skin is warm and dry. No rash noted. No erythema. Psychiatric Mood, affect, and judgment normal. Mini-cog score: 5       Assessment/Plan:     1. Chronic diastolic congestive heart failure (HCC) - newly diagnosed  - still waiting on stress test results  - return after follow up w/ cardiology at the end of March  2. Essential hypertension - stable  - recheck BP at office 130/80  - advised on diet and exercise changes  3. Prediabetes - stable symptomatically  - A1c elevated 5.8 in Oct 2022  - advised on diet and exercise changes  4. Alcohol abuse - improved (stopped drinking alcohol end of January)  - Alcohol-induced cardiomyopathy less likely since no dilation of left ventricle noted  5. Hyperlipidemia - stable  - started on atorvastatin due to elevated levels as well as suspected recent MI  6. Smoking cessation  - Ms. Miles Caicedo is working towards decreasing tobacco consumption using lozenges as well as nicotine patches  - currently not interested in medications or group therapy      Follow up:   No follow-ups on file. Pt was discussed with Dr. Esau Paredes (attending physician). I have reviewed pertinent labs results and other data. I have discussed the diagnosis with the patient and the intended plan as seen in the above orders.  The patient has received an after-visit summary and questions were answered concerning future plans. I have discussed medication side effects and warnings with the patient as well.     Dalila Shelton, [address] [place], [place] [address]flip of Medicine  03/01/23

## 2023-03-01 NOTE — PROGRESS NOTES
History of Present Illness:     Chief Complaint   Patient presents with    Referral Follow Up     Cardiology Follow Up - Discuss Results        Chilo Parra is a 70 y.o. female     Would like to discuss results from her heart testing  Evaluated by Dr. Tato Wing  She had a stress test and an echo performed    Since the episode that started this she stopped drinking  Last drink was 40 days ago    Still smoking  Using Lozenges to help quit    PMH (REVIEWED):  Past Medical History:   Diagnosis Date    Anxiety     Arthritis     Depression     Dizziness     Falls     HTN (hypertension) 6/25/2010    Hypothyroid 6/25/2010    Thyroid disease     Wrist fracture, left        Current Medications/Allergies (REVIEWED):     Current Outpatient Medications on File Prior to Visit   Medication Sig Dispense Refill    Synthroid 150 mcg tablet TAKE 1 TABLET BY MOUTH DAILY BEFORE BREAKFAST 30 Tablet 0    Blood Pressure Monitor kit 1 Applicator by Does Not Apply route daily. 1 Kit 0    lisinopriL (PRINIVIL, ZESTRIL) 10 mg tablet Take 1 Tablet by mouth daily. 90 Tablet 3    aspirin delayed-release 81 mg tablet Take 1 Tablet by mouth daily. 90 Tablet 3    nicotine (NICODERM CQ) 21 mg/24 hr 1 Patch by TransDERmal route every twenty-four (24) hours for 45 days. (Patient not taking: Reported on 3/1/2023) 45 Patch 0    ergocalciferol (ERGOCALCIFEROL) 1,250 mcg (50,000 unit) capsule Take 1 Capsule by mouth every seven (7) days. (Patient not taking: Reported on 3/1/2023) 13 Capsule 3    [START ON 3/13/2023] nicotine (NICODERM CQ) 14 mg/24 hr patch 1 Patch by TransDERmal route every twenty-four (24) hours for 15 days. (Patient not taking: Reported on 3/1/2023) 15 Patch 0     No current facility-administered medications on file prior to visit.        Allergies   Allergen Reactions    Penicillins Other (comments)     Yeast infection         Review of Systems:     ROS       Objective:     Vitals:    03/01/23 0927   BP: (!) 144/81   Pulse: 94   Resp: 18 Temp: 98.1 °F (36.7 °C)   TempSrc: Oral   SpO2: 98%   Weight: 154 lb (69.9 kg)   Height: 5' 10\" (1.778 m)       Physical Exam:  General appearance - alert, well appearing, and in no distress  Mental status - alert, oriented to person, place, and time, normal mood, behavior, speech, dress, motor activity, and thought processes  Chest - clear to auscultation, no wheezes, rales or rhonchi, symmetric air entry  Heart - Normal rate, regular rhythm. 3/6 JOE over left sternal boarder  Extremities - peripheral pulses normal, no pedal edema, no clubbing or cyanosis    Recent Labs:  No results found for this or any previous visit (from the past 12 hour(s)). Assessment and Plan:       ICD-10-CM ICD-9-CM    1. Chronic diastolic congestive heart failure (HCC)  I50.32 428.32      428.0       2. Essential hypertension  I10 401.9       3. Prediabetes  R73.03 790.29       4. Alcohol abuse  F10.10 305.00       5. Vitamin D deficiency  E55.9 268.9       6. Alcohol-induced insomnia ([facility] [address]  F10.982 291.82       7. Current every day smoker  F17.200 305.1           CHF, newly dx  Noted on echo done last month  Stress test pending  Follow up scheduled with Cardiology 3/27  Started statin    HTN, stable  Taking Lisinopril as prescribed    EtOH abuse  Recently quit drinking  Would like to sustain her cessation   Does recognize she is an alcoholic  Offered support, recommended AA    Current smoker  Does not want to use nicotine patches  Will continue with Lozenges      Refilled Vitamin D  Will check vitamin D level at next visit    Follow up: 1 month, schedule after visit with Cardiologist    Steve Franz MD    We discussed the expected course, resolution and complications of the diagnosis(es) in detail. Medication risks, benefits, costs, interactions, and alternatives were discussed as indicated. I advised her to contact the office if her condition worsens, changes or fails to improve as anticipated.  She expressed understanding with the diagnosis(es) and plan.

## 2023-03-01 NOTE — PROGRESS NOTES
Chief Complaint   Patient presents with    Referral Follow Up     Cardiology Follow Up - Discuss Results      Visit Vitals  BP (!) 144/81 (BP 1 Location: Right upper arm, BP Patient Position: Sitting, BP Cuff Size: Adult)   Pulse 94   Temp 98.1 °F (36.7 °C) (Oral)   Resp 18   Ht 5' 10\" (1.778 m)   Wt 154 lb (69.9 kg)   SpO2 98%   BMI 22.10 kg/m²     1. Have you been to the ER, urgent care clinic since your last visit? Hospitalized since your last visit? No    2. Have you seen or consulted any other health care providers outside of the [address] [place] [address] since your last visit? Include any pap smears or colon screening. No  \    Blood pressure recheck per physician.  BP Recheck performed, outcome listed below   Vitals:    03/01/23 0927 03/01/23 1029   BP: (!) 144/81 130/80   BP 1 Location: Right upper arm Right upper arm   BP Patient Position: Sitting Sitting   BP Cuff Size: Adult Adult   Pulse: 94 85   Temp: 98.1 °F (36.7 °C)    TempSrc: Oral    Resp: 18    Height: 5' 10\" (1.778 m)    Weight: 154 lb (69.9 kg)    SpO2: 98%

## 2023-03-01 NOTE — Clinical Note
Chapin Colvin,  I need her visit to be after her Cardiology visit 3/27. Can you make sure we change her to an April visit when the schedule is available?

## 2023-03-06 ENCOUNTER — TELEPHONE (OUTPATIENT)
Dept: FAMILY MEDICINE CLINIC | Age: 68
End: 2023-03-06

## 2023-03-06 NOTE — TELEPHONE ENCOUNTER
Spoke with patient to schedule mammogram ordered by Darren Lombard, MD on 01/30/23.  Pt scheduled for 03/08/23 at 8:30 AM.

## 2023-03-08 ENCOUNTER — HOSPITAL ENCOUNTER (OUTPATIENT)
Dept: MAMMOGRAPHY | Age: 68
Discharge: HOME | End: 2023-03-08
Attending: FAMILY MEDICINE
Payer: MEDICARE

## 2023-03-08 DIAGNOSIS — Z12.31 VISIT FOR SCREENING MAMMOGRAM: ICD-10-CM

## 2023-03-08 PROCEDURE — 77063 BREAST TOMOSYNTHESIS BI: CPT

## 2023-03-17 ENCOUNTER — TELEPHONE (OUTPATIENT)
Dept: CARDIOLOGY CLINIC | Age: 68
End: 2023-03-17

## 2023-03-17 NOTE — TELEPHONE ENCOUNTER
Call placed to patient and ID [id number]- conveyed results below. ----- Message from Patel Mckay MD sent at 3/14/2023  3:47 PM EDT -----  Hi, good news-your stress test showed normal blood flow to the heart muscle.   Your heart function was normal.    Dr. Batista Lot

## 2023-03-21 ENCOUNTER — OFFICE VISIT (OUTPATIENT)
Dept: FAMILY MEDICINE CLINIC | Age: 68
End: 2023-03-21
Payer: MEDICARE

## 2023-03-21 VITALS
HEIGHT: 70 IN | SYSTOLIC BLOOD PRESSURE: 129 MMHG | DIASTOLIC BLOOD PRESSURE: 77 MMHG | RESPIRATION RATE: 18 BRPM | HEART RATE: 92 BPM | OXYGEN SATURATION: 95 % | BODY MASS INDEX: 22.56 KG/M2 | WEIGHT: 157.6 LBS | TEMPERATURE: 98 F

## 2023-03-21 DIAGNOSIS — F10.10 ALCOHOL ABUSE: Primary | ICD-10-CM

## 2023-03-21 DIAGNOSIS — F17.200 SMOKER: ICD-10-CM

## 2023-03-21 PROCEDURE — G8427 DOCREV CUR MEDS BY ELIG CLIN: HCPCS | Performed by: FAMILY MEDICINE

## 2023-03-21 PROCEDURE — 3017F COLORECTAL CA SCREEN DOC REV: CPT | Performed by: FAMILY MEDICINE

## 2023-03-21 PROCEDURE — 1101F PT FALLS ASSESS-DOCD LE1/YR: CPT | Performed by: FAMILY MEDICINE

## 2023-03-21 PROCEDURE — 1123F ACP DISCUSS/DSCN MKR DOCD: CPT | Performed by: FAMILY MEDICINE

## 2023-03-21 PROCEDURE — G9899 SCRN MAM PERF RSLTS DOC: HCPCS | Performed by: FAMILY MEDICINE

## 2023-03-21 PROCEDURE — G8420 CALC BMI NORM PARAMETERS: HCPCS | Performed by: FAMILY MEDICINE

## 2023-03-21 PROCEDURE — G8510 SCR DEP NEG, NO PLAN REQD: HCPCS | Performed by: FAMILY MEDICINE

## 2023-03-21 PROCEDURE — 1090F PRES/ABSN URINE INCON ASSESS: CPT | Performed by: FAMILY MEDICINE

## 2023-03-21 PROCEDURE — 3074F SYST BP LT 130 MM HG: CPT | Performed by: FAMILY MEDICINE

## 2023-03-21 PROCEDURE — G8536 NO DOC ELDER MAL SCRN: HCPCS | Performed by: FAMILY MEDICINE

## 2023-03-21 PROCEDURE — 3078F DIAST BP <80 MM HG: CPT | Performed by: FAMILY MEDICINE

## 2023-03-21 PROCEDURE — G8399 PT W/DXA RESULTS DOCUMENT: HCPCS | Performed by: FAMILY MEDICINE

## 2023-03-21 PROCEDURE — 99213 OFFICE O/P EST LOW 20 MIN: CPT | Performed by: FAMILY MEDICINE

## 2023-03-21 NOTE — PROGRESS NOTES
History of Present Illness:     Chief Complaint   Patient presents with    Referral Follow Up     Cardiologist;        Raj Faulkner is a 70 y.o. female     Visit to discuss recent cardiology visit  Reviewed note from Dr. Hermann Childers - normal stress test and heart function    Report she has not had a drink   End of January 2023    Smoker  Using nicotine lozenges  Has nicotine patches but has not started using them    PMH (REVIEWED):  Past Medical History:   Diagnosis Date    Anxiety     Arthritis     Depression     Dizziness     Falls     HTN (hypertension) 6/25/2010    Hypothyroid 6/25/2010    Thyroid disease     Wrist fracture, left        Current Medications/Allergies (REVIEWED):     Current Outpatient Medications on File Prior to Visit   Medication Sig Dispense Refill    Synthroid 150 mcg tablet TAKE 1 TABLET BY MOUTH DAILY BEFORE BREAKFAST 90 Tablet 3    ergocalciferol (ERGOCALCIFEROL) 1,250 mcg (50,000 unit) capsule Take 1 Capsule by mouth every seven (7) days. 12 Capsule 0    atorvastatin (LIPITOR) 40 mg tablet Take 1 Tablet by mouth daily. 90 Tablet 3    Blood Pressure Monitor kit 1 Applicator by Does Not Apply route daily. 1 Kit 0    lisinopriL (PRINIVIL, ZESTRIL) 10 mg tablet Take 1 Tablet by mouth daily. 90 Tablet 3    nicotine (NICODERM CQ) 14 mg/24 hr patch 1 Patch by TransDERmal route every twenty-four (24) hours for 15 days. 15 Patch 0    aspirin delayed-release 81 mg tablet Take 1 Tablet by mouth daily. 90 Tablet 3     No current facility-administered medications on file prior to visit.        Allergies   Allergen Reactions    Penicillins Other (comments)     Yeast infection         Review of Systems:     Denies CP, palpitations, SOB    Objective:     Vitals:    03/21/23 1105   BP: 129/77   Pulse: 92   Resp: 18   Temp: 98 °F (36.7 °C)   TempSrc: Oral   SpO2: 95%   Weight: 157 lb 9.6 oz (71.5 kg)   Height: 5' 10\" (1.778 m)       Physical Exam:  General appearance - alert, well appearing, and in no distress  Chest - no tachypnea, retractions or cyanosis  Neurological - alert, oriented, normal speech, no focal findings or movement disorder noted    Recent Labs:  No results found for this or any previous visit (from the past 12 hour(s)). Assessment and Plan:       ICD-10-CM ICD-9-CM    1. Alcohol abuse  F10.10 305.00       2. Smoker  F17.200 305.1             Alcohol abuse in remission  Last drink January 2023  Encouraged to continue with cessation    Smoker, current  Motivated to quit  Using Nicotine lozenges   Has Nicotine patches, encouraged to start using    We discussed her recent cardiac testing  Relieved to hear everything was fine  Will follow up with Cardiology PRN  Cost to see specialist was a barrier to her follow up there    Follow up: 3 mo    Manpreet Mayberry MD      We discussed the expected course, resolution and complications of the diagnosis(es) in detail. Medication risks, benefits, costs, interactions, and alternatives were discussed as indicated. I advised her to contact the office if her condition worsens, changes or fails to improve as anticipated. She expressed understanding with the diagnosis(es) and plan.

## 2023-03-21 NOTE — PROGRESS NOTES
Chief Complaint   Patient presents with    Referral Follow Up     Cardiologist;    Per note on file results will be forwarded to PCP     Visit Vitals  /77 (BP 1 Location: Right upper arm, BP Patient Position: Sitting, BP Cuff Size: Adult)   Pulse 92   Temp 98 °F (36.7 °C) (Oral)   Resp 18   Ht 5' 10\" (1.778 m)   Wt 157 lb 9.6 oz (71.5 kg)   SpO2 95%   BMI 22.61 kg/m²     1. Have you been to the ER, urgent care clinic since your last visit? Hospitalized since your last visit? No    2. Have you seen or consulted any other health care providers outside of the [address] [place] [address] since your last visit? Include any pap smears or colon screening.  No

## 2023-04-05 ENCOUNTER — OFFICE VISIT (OUTPATIENT)
Dept: FAMILY MEDICINE CLINIC | Age: 68
End: 2023-04-05
Payer: MEDICARE

## 2023-04-05 LAB
BILIRUB UR QL STRIP: NEGATIVE
GLUCOSE UR-MCNC: NEGATIVE MG/DL
KETONES P FAST UR STRIP-MCNC: NEGATIVE MG/DL
PH UR STRIP: 6 [PH] (ref 4.6–8)
PROT UR QL STRIP: NEGATIVE
SP GR UR STRIP: 1.02 (ref 1–1.03)
UA UROBILINOGEN AMB POC: NORMAL (ref 0.2–1)
URINALYSIS CLARITY POC: CLEAR
URINALYSIS COLOR POC: NORMAL
URINE BLOOD POC: NEGATIVE
URINE LEUKOCYTES POC: NEGATIVE
URINE NITRITES POC: NEGATIVE

## 2023-04-05 PROCEDURE — G8510 SCR DEP NEG, NO PLAN REQD: HCPCS | Performed by: FAMILY MEDICINE

## 2023-04-05 PROCEDURE — 1090F PRES/ABSN URINE INCON ASSESS: CPT | Performed by: FAMILY MEDICINE

## 2023-04-05 PROCEDURE — G8420 CALC BMI NORM PARAMETERS: HCPCS | Performed by: FAMILY MEDICINE

## 2023-04-05 PROCEDURE — G8427 DOCREV CUR MEDS BY ELIG CLIN: HCPCS | Performed by: FAMILY MEDICINE

## 2023-04-05 PROCEDURE — 1101F PT FALLS ASSESS-DOCD LE1/YR: CPT | Performed by: FAMILY MEDICINE

## 2023-04-05 PROCEDURE — G8536 NO DOC ELDER MAL SCRN: HCPCS | Performed by: FAMILY MEDICINE

## 2023-04-05 PROCEDURE — 1123F ACP DISCUSS/DSCN MKR DOCD: CPT | Performed by: FAMILY MEDICINE

## 2023-04-05 PROCEDURE — 99213 OFFICE O/P EST LOW 20 MIN: CPT | Performed by: FAMILY MEDICINE

## 2023-04-05 PROCEDURE — G9899 SCRN MAM PERF RSLTS DOC: HCPCS | Performed by: FAMILY MEDICINE

## 2023-04-05 PROCEDURE — 81003 URINALYSIS AUTO W/O SCOPE: CPT | Performed by: FAMILY MEDICINE

## 2023-04-05 PROCEDURE — 3017F COLORECTAL CA SCREEN DOC REV: CPT | Performed by: FAMILY MEDICINE

## 2023-04-05 PROCEDURE — G8399 PT W/DXA RESULTS DOCUMENT: HCPCS | Performed by: FAMILY MEDICINE

## 2023-04-05 RX ORDER — CLOTRIMAZOLE 1 %
CREAM (GRAM) TOPICAL 2 TIMES DAILY
Qty: 15 G | Refills: 0 | Status: SHIPPED
Start: 2023-04-05

## 2023-04-05 NOTE — PROGRESS NOTES
History of Present Illness:     Chief Complaint   Patient presents with    Epigastric Pain     Excessive gas and pain located under right breast comes and goes. Within the past 24 hours. Has improvement. Denies pain at the time of assessment      Vaginal Itching     C/o vaginal itching, has change laundry detergent, no currently sexually active within the past 7 to 8 years       Anastacia Garcia is a 70 y.o. female     Sharp pains under right breast  Feels like it is along a rib  Pain first started yesterday  Comes and goes, sharp  Feels like she has been gassy    Vaginal itching  Did change her laundry detergent recently      PMH (REVIEWED):  Past Medical History:   Diagnosis Date    Anxiety     Arthritis     Depression     Dizziness     Falls     HTN (hypertension) 6/25/2010    Hypothyroid 6/25/2010    Thyroid disease     Wrist fracture, left        Current Medications/Allergies (REVIEWED):     Current Outpatient Medications on File Prior to Visit   Medication Sig Dispense Refill    Synthroid 150 mcg tablet TAKE 1 TABLET BY MOUTH DAILY BEFORE BREAKFAST 90 Tablet 3    ergocalciferol (ERGOCALCIFEROL) 1,250 mcg (50,000 unit) capsule Take 1 Capsule by mouth every seven (7) days. 12 Capsule 0    atorvastatin (LIPITOR) 40 mg tablet Take 1 Tablet by mouth daily. 90 Tablet 3    Blood Pressure Monitor kit 1 Applicator by Does Not Apply route daily. 1 Kit 0    lisinopriL (PRINIVIL, ZESTRIL) 10 mg tablet Take 1 Tablet by mouth daily. 90 Tablet 3    aspirin delayed-release 81 mg tablet Take 1 Tablet by mouth daily. 90 Tablet 3     No current facility-administered medications on file prior to visit. Allergies   Allergen Reactions    Penicillins Other (comments)     Yeast infection         Review of Systems:     Review of Systems   Constitutional:  Negative for chills and fever. Respiratory:  Negative for cough and shortness of breath. Cardiovascular:  Negative for chest pain and palpitations.    Genitourinary: Negative for dysuria, frequency, hematuria and urgency. Skin:  Positive for itching and rash. Objective:     Vitals:    04/05/23 1330   Resp: 18   Weight: 157 lb (71.2 kg)   Height: 5' 10\" (1.778 m)       Physical Exam:  General appearance - alert, well appearing, and in no distress  Breast/ chest - Breasts normal in appearance, symmetric. +area of erythema under right breast. No masses palpated. No tenderness to palpation. Skin - Area of erythema, small papules on abdomen     Recent Labs:  Recent Results (from the past 12 hour(s))   AMB POC URINALYSIS DIP STICK AUTO W/O MICRO    Collection Time: 04/05/23  2:52 PM   Result Value Ref Range    Color (UA POC) Orange     Clarity (UA POC) Clear     Glucose (UA POC) Negative Negative    Bilirubin (UA POC) Negative Negative    Ketones (UA POC) Negative Negative    Specific gravity (UA POC) 1.020 1.001 - 1.035    Blood (UA POC) Negative Negative    pH (UA POC) 6.0 4.6 - 8.0    Protein (UA POC) Negative Negative    Urobilinogen (UA POC) 1 mg/dL 0.2 - 1    Nitrites (UA POC) Negative Negative    Leukocyte esterase (UA POC) Negative Negative       Assessment and Plan:       ICD-10-CM ICD-9-CM    1. Vaginal itching  N89.8 698.1 clotrimazole (LOTRIMIN) 1 % topical cream      2. Chest wall pain  R07.89 786.52       3. Dark urine  R82.998 791.9 AMB POC URINALYSIS DIP STICK AUTO W/O MICRO          Vaginal itching  Suspect irritant causing symptoms  UA unremarkable  Sending Clotrimazole cream to use    Chest wall pain  Normal breast and chest exam with no TTP  Can use Tylenol PRN pain  Avoid foods that trigger  Gave pt s/sx for shingles, some level of suspicion given normal exam but chest wall pain as it could be prodrome. Follow up: 1 month    Nay Ramon MD    We discussed the expected course, resolution and complications of the diagnosis(es) in detail. Medication risks, benefits, costs, interactions, and alternatives were discussed as indicated.   I advised her to contact the office if her condition worsens, changes or fails to improve as anticipated. She expressed understanding with the diagnosis(es) and plan.

## 2023-05-30 ENCOUNTER — TELEPHONE (OUTPATIENT)
Age: 68
End: 2023-05-30

## 2023-05-31 RX ORDER — ERGOCALCIFEROL 1.25 MG/1
CAPSULE ORAL
Qty: 12 CAPSULE | Refills: 0 | Status: SHIPPED | OUTPATIENT
Start: 2023-05-31 | End: 2023-06-29 | Stop reason: SDUPTHER

## 2023-06-29 ENCOUNTER — OFFICE VISIT (OUTPATIENT)
Age: 68
End: 2023-06-29
Payer: MEDICARE

## 2023-06-29 VITALS
HEIGHT: 70 IN | OXYGEN SATURATION: 99 % | RESPIRATION RATE: 18 BRPM | HEART RATE: 85 BPM | BODY MASS INDEX: 22.53 KG/M2 | SYSTOLIC BLOOD PRESSURE: 131 MMHG | DIASTOLIC BLOOD PRESSURE: 76 MMHG

## 2023-06-29 DIAGNOSIS — E03.9 ACQUIRED HYPOTHYROIDISM: ICD-10-CM

## 2023-06-29 DIAGNOSIS — E78.2 MIXED HYPERLIPIDEMIA: ICD-10-CM

## 2023-06-29 DIAGNOSIS — Z87.891 PERSONAL HISTORY OF TOBACCO USE: ICD-10-CM

## 2023-06-29 DIAGNOSIS — T88.7XXA SIDE EFFECT OF MEDICATION: ICD-10-CM

## 2023-06-29 DIAGNOSIS — E55.9 VITAMIN D DEFICIENCY, UNSPECIFIED: ICD-10-CM

## 2023-06-29 DIAGNOSIS — I10 ESSENTIAL (PRIMARY) HYPERTENSION: Primary | ICD-10-CM

## 2023-06-29 PROCEDURE — 99214 OFFICE O/P EST MOD 30 MIN: CPT | Performed by: FAMILY MEDICINE

## 2023-06-29 PROCEDURE — 3078F DIAST BP <80 MM HG: CPT | Performed by: FAMILY MEDICINE

## 2023-06-29 PROCEDURE — G0296 VISIT TO DETERM LDCT ELIG: HCPCS | Performed by: FAMILY MEDICINE

## 2023-06-29 PROCEDURE — 1123F ACP DISCUSS/DSCN MKR DOCD: CPT | Performed by: FAMILY MEDICINE

## 2023-06-29 PROCEDURE — 3075F SYST BP GE 130 - 139MM HG: CPT | Performed by: FAMILY MEDICINE

## 2023-06-29 RX ORDER — LEVOTHYROXINE SODIUM 0.15 MG/1
150 TABLET ORAL DAILY
Qty: 90 TABLET | Refills: 3 | Status: SHIPPED | OUTPATIENT
Start: 2023-06-29

## 2023-06-29 RX ORDER — ERGOCALCIFEROL 1.25 MG/1
50000 CAPSULE ORAL WEEKLY
COMMUNITY
Start: 2018-02-14 | End: 2023-06-29 | Stop reason: SDUPTHER

## 2023-06-29 RX ORDER — CLOTRIMAZOLE 1 %
CREAM (GRAM) TOPICAL 2 TIMES DAILY
COMMUNITY
Start: 2023-04-05

## 2023-06-29 RX ORDER — ERGOCALCIFEROL 1.25 MG/1
50000 CAPSULE ORAL WEEKLY
Qty: 12 CAPSULE | Refills: 1 | Status: SHIPPED | OUTPATIENT
Start: 2023-06-29

## 2023-06-29 RX ORDER — LISINOPRIL 10 MG/1
TABLET ORAL
Qty: 90 TABLET | OUTPATIENT
Start: 2023-06-29

## 2023-06-29 RX ORDER — LISINOPRIL 10 MG/1
10 TABLET ORAL DAILY
Qty: 90 TABLET | Refills: 3 | Status: SHIPPED | OUTPATIENT
Start: 2023-06-29

## 2023-06-29 ASSESSMENT — ENCOUNTER SYMPTOMS
COUGH: 0
SHORTNESS OF BREATH: 0

## 2023-09-21 ENCOUNTER — TELEPHONE (OUTPATIENT)
Age: 68
End: 2023-09-21

## 2023-09-21 NOTE — TELEPHONE ENCOUNTER
This writer verified patient by 2 identifiers with patient. Informed patient after speaking with Dr Shirlene Tillman to see if patient can come into office for an appointment at 12:00 PM on Friday 9/22/23. Patient agreed to appointment and time.

## 2023-09-21 NOTE — TELEPHONE ENCOUNTER
Called; no answer. Left message to contact office as soon as possible and if chest pain, shortness of breath, palpitations, dizziness persist to call 911.

## 2023-09-21 NOTE — TELEPHONE ENCOUNTER
Pt called back and stated that she only wants to see Dr. Louisa Hirsch. She stated that was not experiencing any chest pain or SOB at the moment. She typically is easily winded, feeling pains all over body, and generally \"just doesn't feel well, at all. \" She has been scheduled with Dr. Louisa Hirsch for 10/26 (first avail). However, she is requesting that Dr. Louisa Hirsch fit her in asap.     Thank you

## 2023-09-21 NOTE — TELEPHONE ENCOUNTER
Returned phone call; no answer. Left message asking for patient to contact office regarding her scheduled appointment and to discuss the lack of sooner availability with Dr. Gilma Garcia.

## 2023-09-22 ENCOUNTER — OFFICE VISIT (OUTPATIENT)
Age: 68
End: 2023-09-22

## 2023-09-22 DIAGNOSIS — R10.13 EPIGASTRIC DISCOMFORT: Primary | ICD-10-CM

## 2023-09-22 DIAGNOSIS — R10.13 EPIGASTRIC DISCOMFORT: ICD-10-CM

## 2023-09-22 RX ORDER — PANTOPRAZOLE SODIUM 40 MG/1
40 TABLET, DELAYED RELEASE ORAL
Qty: 90 TABLET | Refills: 0 | Status: SHIPPED | OUTPATIENT
Start: 2023-09-22

## 2023-09-22 RX ORDER — PANTOPRAZOLE SODIUM 40 MG/1
40 TABLET, DELAYED RELEASE ORAL
Qty: 30 TABLET | Refills: 0 | Status: SHIPPED | OUTPATIENT
Start: 2023-09-22 | End: 2023-09-22

## 2023-09-22 NOTE — PROGRESS NOTES
Spoke with patient regarding concerns about her insurance. She is working through insurance issues at this time and will schedule a visit once things are sorted out. She did express concerns of acid reflux. Will send in PPI for her. Follow up and further work up once she has insurance figured out.      Diallo Almazan MD 9/22/2023

## 2023-09-22 NOTE — TELEPHONE ENCOUNTER
Patient came to clinic for a scheduled appointment with Dr. Debbie Gray. Sat with Ms. Santillan Shaniaon to explain that her [facility] Advantage plan is considered OON with [facility] as of 8/1/23. As her plan is HMO, ALYSSA is required before any further visits with Dr. Debbie Gray. She reports she did not receive any notice from [address] [place] [address] but did receive the letter from our practice. She inquired about next steps. Provided her with a packet of information with resources on how to navigate next steps. Also provided her with a list of all of the other Medicare Advantage carriers, still in network with [facility]. She reports she will look into [employer]. Reviewed the Continuity of Care process with  MsJanet Jacque Montanez, and explained that this is a temporary solution. A ALYSSA request has been submitted to [employer], on Ms. Arauz's behalf, via [employer]. I will call Ms. Jacque Lisseth with updates once [employer] has reviewed. Current status is \"pended. \" She has my direct number to reach out with any questions as needed.

## 2023-10-26 ENCOUNTER — OFFICE VISIT (OUTPATIENT)
Age: 68
End: 2023-10-26
Payer: MEDICARE

## 2023-10-26 VITALS
HEART RATE: 78 BPM | DIASTOLIC BLOOD PRESSURE: 87 MMHG | OXYGEN SATURATION: 100 % | RESPIRATION RATE: 18 BRPM | HEIGHT: 70 IN | SYSTOLIC BLOOD PRESSURE: 147 MMHG | BODY MASS INDEX: 24.2 KG/M2 | WEIGHT: 169 LBS

## 2023-10-26 DIAGNOSIS — Z11.59 NEED FOR HEPATITIS C SCREENING TEST: ICD-10-CM

## 2023-10-26 DIAGNOSIS — Z53.20 STATIN DECLINED: ICD-10-CM

## 2023-10-26 DIAGNOSIS — R10.13 EPIGASTRIC DISCOMFORT: ICD-10-CM

## 2023-10-26 DIAGNOSIS — E03.9 ACQUIRED HYPOTHYROIDISM: ICD-10-CM

## 2023-10-26 DIAGNOSIS — I10 ESSENTIAL (PRIMARY) HYPERTENSION: Primary | ICD-10-CM

## 2023-10-26 DIAGNOSIS — R73.09 ELEVATED HEMOGLOBIN A1C: ICD-10-CM

## 2023-10-26 DIAGNOSIS — E78.2 MIXED HYPERLIPIDEMIA: ICD-10-CM

## 2023-10-26 PROCEDURE — 99213 OFFICE O/P EST LOW 20 MIN: CPT | Performed by: FAMILY MEDICINE

## 2023-10-26 PROCEDURE — 3079F DIAST BP 80-89 MM HG: CPT | Performed by: FAMILY MEDICINE

## 2023-10-26 PROCEDURE — 1123F ACP DISCUSS/DSCN MKR DOCD: CPT | Performed by: FAMILY MEDICINE

## 2023-10-26 PROCEDURE — 3077F SYST BP >= 140 MM HG: CPT | Performed by: FAMILY MEDICINE

## 2023-10-26 RX ORDER — PANTOPRAZOLE SODIUM 40 MG/1
40 TABLET, DELAYED RELEASE ORAL
Qty: 90 TABLET | Refills: 0 | Status: SHIPPED | OUTPATIENT
Start: 2023-10-26

## 2023-10-26 SDOH — ECONOMIC STABILITY: FOOD INSECURITY: WITHIN THE PAST 12 MONTHS, THE FOOD YOU BOUGHT JUST DIDN'T LAST AND YOU DIDN'T HAVE MONEY TO GET MORE.: NEVER TRUE

## 2023-10-26 SDOH — ECONOMIC STABILITY: HOUSING INSECURITY
IN THE LAST 12 MONTHS, WAS THERE A TIME WHEN YOU DID NOT HAVE A STEADY PLACE TO SLEEP OR SLEPT IN A SHELTER (INCLUDING NOW)?: NO

## 2023-10-26 SDOH — ECONOMIC STABILITY: INCOME INSECURITY: HOW HARD IS IT FOR YOU TO PAY FOR THE VERY BASICS LIKE FOOD, HOUSING, MEDICAL CARE, AND HEATING?: NOT HARD AT ALL

## 2023-10-26 SDOH — ECONOMIC STABILITY: FOOD INSECURITY: WITHIN THE PAST 12 MONTHS, YOU WORRIED THAT YOUR FOOD WOULD RUN OUT BEFORE YOU GOT MONEY TO BUY MORE.: NEVER TRUE

## 2023-10-26 ASSESSMENT — ENCOUNTER SYMPTOMS: ABDOMINAL PAIN: 1

## 2023-10-26 NOTE — PROGRESS NOTES
History of Present Illness:     No chief complaint on file. Renee Alvarez is a 70 y.o. female     Concern for GERD last visit  Never started the Protonix, pharmacy said they didn't have it    [facility] is helping     PMH (REVIEWED):  Past Medical History:   Diagnosis Date    Anxiety     Arthritis     Depression     Dizziness     Falls     HTN (hypertension) 6/25/2010    Hypothyroid 6/25/2010    Thyroid disease     Wrist fracture, left        Current Medications/Allergies (REVIEWED):     Current Outpatient Medications on File Prior to Visit   Medication Sig Dispense Refill    lisinopril (PRINIVIL;ZESTRIL) 10 MG tablet Take 1 tablet by mouth daily 90 tablet 3    levothyroxine (SYNTHROID) 150 MCG tablet Take 1 tablet by mouth Daily 90 tablet 3    MULTIPLE VITAMIN PO Take 1 tablet by mouth daily      clotrimazole (LOTRIMIN) 1 % cream Apply topically 2 times daily      vitamin D (ERGOCALCIFEROL) 1.25 MG (35216 UT) CAPS capsule Take 1 capsule by mouth once a week 12 capsule 1    Blood Pressure Monitor KIT 1 applicator by Other route daily      aspirin 81 MG EC tablet Take 1 tablet by mouth daily       No current facility-administered medications on file prior to visit. Allergies   Allergen Reactions    Penicillins Other (See Comments)     Yeast infection  Yeast infection         Review of Systems:     Review of Systems   Gastrointestinal:  Positive for abdominal pain. Psychiatric/Behavioral:  Positive for agitation and dysphoric mood.          Objective:     Vitals:    10/26/23 1332   BP: (!) 147/87   Site: Right Upper Arm   Position: Sitting   Cuff Size: Medium Adult   Pulse: 78   Resp: 18   SpO2: 100%   Weight: 76.7 kg (169 lb)   Height: 1.778 m (5' 10\")       Physical Exam:  General appearance - alert, well appearing, and in no distress  Mental status - alert, oriented to person, place, and time, depressed mood, affect appropriate to mood  Abdomen - soft, nontender, nondistended, no masses or

## 2023-10-26 NOTE — PROGRESS NOTES
Chief Complaint   Patient presents with    Follow-up     Increase Life Stressors     Vitals:    10/26/23 1332   BP: (!) 147/87   Site: Right Upper Arm   Position: Sitting   Cuff Size: Medium Adult   Pulse: 78   Resp: 18   SpO2: 100%   Weight: 76.7 kg (169 lb)   Height: 1.778 m (5' 10\")     1. Have you been to the ER, urgent care clinic since your last visit? Hospitalized since your last visit? No    2. Have you seen or consulted any other health care providers outside of the [address] [place] [address] since your last visit? Include any pap smears or colon screening.  No

## 2023-10-27 ENCOUNTER — TELEPHONE (OUTPATIENT)
Age: 68
End: 2023-10-27

## 2023-10-27 LAB
HBA1C MFR BLD: 6 % (ref 4.8–5.6)
HCV IGG SERPL QL IA: NON REACTIVE

## 2023-10-27 NOTE — TELEPHONE ENCOUNTER
. Joey Hennessy stated that she has something important to tell you and asked that you contact her asap. She did not specify subject matter.     Thank you

## 2023-10-28 LAB
BUN SERPL-MCNC: 8 MG/DL (ref 8–27)
BUN/CREAT SERPL: 10 (ref 12–28)
CALCIUM SERPL-MCNC: 9.7 MG/DL (ref 8.7–10.3)
CHLORIDE SERPL-SCNC: 96 MMOL/L (ref 96–106)
CHOLEST SERPL-MCNC: 236 MG/DL (ref 100–199)
CO2 SERPL-SCNC: 22 MMOL/L (ref 20–29)
CREAT SERPL-MCNC: 0.83 MG/DL (ref 0.57–1)
EGFRCR SERPLBLD CKD-EPI 2021: 76 ML/MIN/1.73
GLUCOSE SERPL-MCNC: 106 MG/DL (ref 70–99)
HDLC SERPL-MCNC: 101 MG/DL
IMP & REVIEW OF LAB RESULTS: NORMAL
LDLC SERPL CALC-MCNC: 122 MG/DL (ref 0–99)
POTASSIUM SERPL-SCNC: 4.4 MMOL/L (ref 3.5–5.2)
SODIUM SERPL-SCNC: 136 MMOL/L (ref 134–144)
TRIGL SERPL-MCNC: 78 MG/DL (ref 0–149)
VLDLC SERPL CALC-MCNC: 13 MG/DL (ref 5–40)

## 2023-12-13 ENCOUNTER — TELEPHONE (OUTPATIENT)
Age: 68
End: 2023-12-13

## 2023-12-13 NOTE — TELEPHONE ENCOUNTER
Pt has requested that you return her phone call. She stated that it is re: a medical device that she need.      Thank you

## 2023-12-13 NOTE — TELEPHONE ENCOUNTER
Patient called back stating that no one had contacted her. I informed patient that both the nurse and doctor were out of clinic today, but they will return on tomorrow and will be in contact with her at their earliest convenience. Thanks!

## 2024-03-01 ENCOUNTER — TELEPHONE (OUTPATIENT)
Age: 69
End: 2024-03-01

## 2024-03-01 NOTE — TELEPHONE ENCOUNTER
This writer attempted to contact patient in reference to message. This writer was not able to reach patient at this time, a voicemail was left advising patient to contact the office.

## 2024-03-01 NOTE — TELEPHONE ENCOUNTER
Hi Dr. Mitchell,    Mrs. Arauz called and stated that she is very upset she said she is tired of talking to A.I. when she calls to the office. She stated that she is having a mental issues and need to figure out what's going on with her inner. She said that she would like Crystal to call her so she can tell her what's going on and she can relay the message to Dr. Mitchell. I asked her if she will be okay once we hang up the phone and she laughed and said yes she would and that she would never do anything to harm herself.

## 2024-04-04 DIAGNOSIS — E03.9 ACQUIRED HYPOTHYROIDISM: ICD-10-CM

## 2024-04-05 RX ORDER — LEVOTHYROXINE SODIUM 150 MCG
150 TABLET ORAL
Qty: 90 TABLET | Refills: 1 | Status: SHIPPED | OUTPATIENT
Start: 2024-04-05

## 2024-06-13 NOTE — TELEPHONE ENCOUNTER
Medication Refill Request    Arti NAYA Arauz is requesting a refill of the following medication(s):   Requested Prescriptions     Pending Prescriptions Disp Refills    atorvastatin (LIPITOR) 40 MG tablet [Pharmacy Med Name: ATORVASTATIN 40MG TABLETS] 90 tablet      Sig: TAKE 1 TABLET BY MOUTH DAILY        Listed PCP is Kathy Mitchell MD   Last provider to prescribe medication: Dr. Mitchell   Last Date of Medication Prescribed: 10/26/2023   Date of Last Office Visit at [facility]: 10/26/2023   Last Labs:  Lab Results   Component Value Date    CHOL 236 (H) 10/26/2023    TRIG 78 10/26/2023     10/26/2023     (H) 10/26/2023    VLDL 13 10/26/2023    CHOLHDLRATIO 1.9 10/24/2022       Future Appointment: No future appointments.      Please send refill to:    [id number] [place], [place] - [address] - P [phone] -  [phone]  [facility] [address]  [facility] [address]  Phone: [phone] Fax: [phone]

## 2024-06-14 RX ORDER — ATORVASTATIN CALCIUM 40 MG/1
40 TABLET, FILM COATED ORAL DAILY
Qty: 90 TABLET | Refills: 1 | Status: SHIPPED | OUTPATIENT
Start: 2024-06-14

## 2024-07-02 ENCOUNTER — TELEPHONE (OUTPATIENT)
Age: 69
End: 2024-07-02

## 2024-07-02 NOTE — TELEPHONE ENCOUNTER
Pt asked that you return her phone call today. She refused to state what the call is regarding. She replied \"Tell Rhina to call me today.\"

## 2024-07-03 ENCOUNTER — TELEPHONE (OUTPATIENT)
Age: 69
End: 2024-07-03

## 2024-07-03 NOTE — TELEPHONE ENCOUNTER
Patient called back again stating that she really needs to speak with her doctor. She stated that she has been having some concerns about her health. Patient knows she is scheduled for an appointment on 7/9, but she is wanting to speak with the doctor or nurse before then. Would like to be contacted at your earliest convenience.    Thanks!

## 2024-07-03 NOTE — TELEPHONE ENCOUNTER
Pt called speaking aggressively. She expressed that she is upset that she can never speak with her doctor, it is unacceptable, and she should be able to speak to her doctor when she need. She said she has been attempting to contact Dr. Mitchell for \"awhile\" now and she never receives a call back. She then stated that she doesn't believe her messages are being sent. Pt was assured that the message that she requested to be sent to nurse, Rhina, on yesterday was sent and this message would be sent as well. She was encouraged to view her acct on [employer] to view the messages sent and/or to communicate with nurse/doctor. She became loud stating that \"Spowit ain't got nothing to do with what I have going on now.\" She then demanded a returned phone call and said \"thank you.\"

## 2024-07-24 ENCOUNTER — TELEPHONE (OUTPATIENT)
Age: 69
End: 2024-07-24

## 2024-07-24 NOTE — TELEPHONE ENCOUNTER
This call was transferred to this writer. Patient states \"I would like to speak with Dr. Mitchell, please have her call me back\". I asked her what was going on and what sx she was experiencing. She stated \"I have already said this and I would like a call back from Dr. Mitchell\" Patient then disconnected call.    Will forward message to Dr. Mitchell to call patient.

## 2024-08-01 ENCOUNTER — TELEPHONE (OUTPATIENT)
Age: 69
End: 2024-08-01

## 2024-08-01 DIAGNOSIS — R73.09 ELEVATED HEMOGLOBIN A1C: ICD-10-CM

## 2024-08-01 DIAGNOSIS — E55.9 VITAMIN D DEFICIENCY, UNSPECIFIED: ICD-10-CM

## 2024-08-01 DIAGNOSIS — I10 ESSENTIAL HYPERTENSION: Primary | ICD-10-CM

## 2024-08-01 DIAGNOSIS — R26.9 GAIT DISTURBANCE: ICD-10-CM

## 2024-08-01 NOTE — TELEPHONE ENCOUNTER
This writer reached out to patient per Provider request. Patient requesting for lab orders to be placed per previous conversation with provider. Patient is requesting for an appointment with provider as well. She would like to get her labs done on Monday. this writer informed patient we will notify Dr Mitchell and return call to her with additional information and apointment time and date. This writer verbalized understanding. No further questions. Routing to provider to place labs.

## 2024-08-01 NOTE — TELEPHONE ENCOUNTER
Both appointments are made.   [FreeTextEntry1] : MRI: [facility] 1/31/21: no evidence of disc herniation or spinal canal stenosis. Reversal of the cervical lordosis, Osteoarthritis of C6-7. \par X-Rays: 10/16/20 disc space narrowing at C2-3 and C6-7 and posterior hypertrophic spur formation at C6-7 \par

## 2024-08-02 NOTE — TELEPHONE ENCOUNTER
This writer verified patient by 2 identifiers. Notified patient that the appointments for her lab and office visit have been made. Patient inquired if we can obtain telephone number from her insurance for transportation as her vehicle is in the shop. Patient was given the number and the times for her upcoming appointment along with the address to the office. Patient aware of appointment times and dates. No further questions at this time.

## 2024-08-07 ENCOUNTER — OFFICE VISIT (OUTPATIENT)
Age: 69
End: 2024-08-07
Payer: MEDICARE

## 2024-08-07 VITALS
TEMPERATURE: 98.4 F | BODY MASS INDEX: 22.19 KG/M2 | OXYGEN SATURATION: 95 % | HEIGHT: 70 IN | RESPIRATION RATE: 18 BRPM | SYSTOLIC BLOOD PRESSURE: 126 MMHG | WEIGHT: 155 LBS | HEART RATE: 96 BPM | DIASTOLIC BLOOD PRESSURE: 86 MMHG

## 2024-08-07 DIAGNOSIS — Z00.00 MEDICARE ANNUAL WELLNESS VISIT, SUBSEQUENT: Primary | ICD-10-CM

## 2024-08-07 DIAGNOSIS — E55.9 VITAMIN D DEFICIENCY: ICD-10-CM

## 2024-08-07 DIAGNOSIS — Z87.891 PERSONAL HISTORY OF TOBACCO USE: ICD-10-CM

## 2024-08-07 DIAGNOSIS — R26.81 GAIT INSTABILITY: ICD-10-CM

## 2024-08-07 DIAGNOSIS — G47.9 SLEEP DISTURBANCE: ICD-10-CM

## 2024-08-07 DIAGNOSIS — R27.0 ATAXIA: ICD-10-CM

## 2024-08-07 DIAGNOSIS — I73.9 PVD (PERIPHERAL VASCULAR DISEASE) (HCC): ICD-10-CM

## 2024-08-07 DIAGNOSIS — Z91.81 AT HIGH RISK FOR FALLS: ICD-10-CM

## 2024-08-07 DIAGNOSIS — E03.4 HYPOTHYROIDISM DUE TO ACQUIRED ATROPHY OF THYROID: ICD-10-CM

## 2024-08-07 PROCEDURE — 99214 OFFICE O/P EST MOD 30 MIN: CPT | Performed by: FAMILY MEDICINE

## 2024-08-07 PROCEDURE — G0439 PPPS, SUBSEQ VISIT: HCPCS | Performed by: FAMILY MEDICINE

## 2024-08-07 PROCEDURE — 3074F SYST BP LT 130 MM HG: CPT | Performed by: FAMILY MEDICINE

## 2024-08-07 PROCEDURE — 1123F ACP DISCUSS/DSCN MKR DOCD: CPT | Performed by: FAMILY MEDICINE

## 2024-08-07 PROCEDURE — G0296 VISIT TO DETERM LDCT ELIG: HCPCS | Performed by: FAMILY MEDICINE

## 2024-08-07 PROCEDURE — 3079F DIAST BP 80-89 MM HG: CPT | Performed by: FAMILY MEDICINE

## 2024-08-07 RX ORDER — ERGOCALCIFEROL 1.25 MG/1
50000 CAPSULE ORAL WEEKLY
Qty: 12 CAPSULE | Refills: 1 | Status: SHIPPED | OUTPATIENT
Start: 2024-08-07

## 2024-08-07 RX ORDER — LEVOTHYROXINE SODIUM 0.1 MG/1
100 TABLET ORAL DAILY
Qty: 90 TABLET | Refills: 1 | Status: SHIPPED | OUTPATIENT
Start: 2024-08-07

## 2024-08-07 RX ORDER — MELATONIN 10 MG
10 CAPSULE ORAL NIGHTLY
Qty: 30 CAPSULE | Refills: 3 | Status: SHIPPED | OUTPATIENT
Start: 2024-08-07

## 2024-08-07 ASSESSMENT — LIFESTYLE VARIABLES
HOW OFTEN DO YOU HAVE A DRINK CONTAINING ALCOHOL: MONTHLY OR LESS
HOW MANY STANDARD DRINKS CONTAINING ALCOHOL DO YOU HAVE ON A TYPICAL DAY: 1 OR 2

## 2024-08-07 ASSESSMENT — ENCOUNTER SYMPTOMS: SHORTNESS OF BREATH: 0

## 2024-08-07 ASSESSMENT — PATIENT HEALTH QUESTIONNAIRE - PHQ9
SUM OF ALL RESPONSES TO PHQ9 QUESTIONS 1 & 2: 2
SUM OF ALL RESPONSES TO PHQ QUESTIONS 1-9: 2
SUM OF ALL RESPONSES TO PHQ QUESTIONS 1-9: 2
1. LITTLE INTEREST OR PLEASURE IN DOING THINGS: NOT AT ALL
SUM OF ALL RESPONSES TO PHQ QUESTIONS 1-9: 2
2. FEELING DOWN, DEPRESSED OR HOPELESS: MORE THAN HALF THE DAYS
SUM OF ALL RESPONSES TO PHQ QUESTIONS 1-9: 2

## 2024-08-07 NOTE — PROGRESS NOTES
History of Present Illness:     Chief Complaint   Patient presents with    Medicare AWV       Arti Arauz is a 69 y.o. female     Falls and balance issues  Had a recent fall getting into the shower    No energy  No weight loss, night sweats or fevers    Poor sleep at night    PMH (REVIEWED):  Past Medical History:   Diagnosis Date    Anxiety     Arthritis     Depression     Dizziness     Falls     HTN (hypertension) 6/25/2010    Hypothyroid 6/25/2010    Thyroid disease     Wrist fracture, left        Current Medications/Allergies (REVIEWED):     Current Outpatient Medications on File Prior to Visit   Medication Sig Dispense Refill    lisinopril (PRINIVIL;ZESTRIL) 10 MG tablet Take 1 tablet by mouth daily 90 tablet 3    MULTIPLE VITAMIN PO Take 1 tablet by mouth daily      Blood Pressure Monitor KIT 1 applicator by Other route daily      aspirin 81 MG EC tablet Take 1 tablet by mouth daily      atorvastatin (LIPITOR) 40 MG tablet TAKE 1 TABLET BY MOUTH DAILY (Patient not taking: Reported on 8/7/2024) 90 tablet 1    pantoprazole (PROTONIX) 40 MG tablet Take 1 tablet by mouth every morning (before breakfast) (Patient not taking: Reported on 8/7/2024) 90 tablet 0    clotrimazole (LOTRIMIN) 1 % cream Apply topically 2 times daily (Patient not taking: Reported on 8/7/2024)       No current facility-administered medications on file prior to visit.       Allergies   Allergen Reactions    Penicillins Other (See Comments)     Yeast infection  Yeast infection         Review of Systems:     Review of Systems   Constitutional:  Positive for fatigue. Negative for unexpected weight change.   Respiratory:  Negative for shortness of breath.    Cardiovascular:  Negative for chest pain, palpitations and leg swelling.   Musculoskeletal:  Positive for gait problem.   Psychiatric/Behavioral:  Positive for sleep disturbance.         Objective:     Vitals:    08/07/24 1508   BP: 126/86   Site: Right Upper Arm   Position: Sitting

## 2024-08-07 NOTE — PROGRESS NOTES
Medicare Annual Wellness Visit    Arti Arauz is here for Medicare AWV    Assessment & Plan   Medicare annual wellness visit, subsequent  Personal history of tobacco use  -     DE VISIT TO DISCUSS LUNG CA SCREEN W LDCT  -     CT Lung Screen (Initial/Annual/Baseline); Future  Gait instability  -     External Referral To Home Health  -     CT HEAD W CONTRAST; Future  PVD (peripheral vascular disease) (HCC)  Sleep disturbance  -     melatonin 10 MG CAPS capsule; Take 1 capsule by mouth nightly, Disp-30 capsule, R-3[employer] Brand All NaturalNormal  At high risk for falls  Vitamin D deficiency  -     vitamin D (ERGOCALCIFEROL) 1.25 MG (64042 UT) CAPS capsule; Take 1 capsule by mouth once a week, Disp-12 capsule, R-1Normal  Hypothyroidism due to acquired atrophy of thyroid  -     levothyroxine (SYNTHROID) 100 MCG tablet; Take 1 tablet by mouth daily, Disp-90 tablet, R-1Normal  Recommendations for Preventive Services Due: see orders and patient instructions/AVS.  Recommended screening schedule for the next 5-10 years is provided to the patient in written form: see Patient Instructions/AVS.     No follow-ups on file.     Subjective   The following acute and/or chronic problems were also addressed today:  See separate progress note    Patient's complete Health Risk Assessment and screening values have been reviewed and are found in Flowsheets. The following problems were reviewed today and where indicated follow up appointments were made and/or referrals ordered.    Positive Risk Factor Screenings with Interventions:    Fall Risk:  Do you feel unsteady or are you worried about falling? : (!) yes  2 or more falls in past year?: (!) yes  Fall with injury in past year?: no     Interventions:    Reviewed medications, home hazards, visual acuity, and co-morbidities that can increase risk for falls  See AVS for additional education material       Drug Use:   Substance and Sexual Activity   Drug Use Yes    Types:

## 2024-09-07 DIAGNOSIS — I10 ESSENTIAL (PRIMARY) HYPERTENSION: ICD-10-CM

## 2024-09-09 RX ORDER — LISINOPRIL 10 MG/1
10 TABLET ORAL DAILY
Qty: 90 TABLET | Refills: 3 | Status: SHIPPED | OUTPATIENT
Start: 2024-09-09

## 2024-09-25 ENCOUNTER — TELEPHONE (OUTPATIENT)
Age: 69
End: 2024-09-25

## 2024-12-06 NOTE — TELEPHONE ENCOUNTER
Done Prep Survey      Flowsheet Row Responses   [facility] patient discharged from? [place]   Is LACE score < 7 ? No   Eligibility [facility]   Date of Admission 12/03/24   Date of Discharge 12/06/24   Discharge Disposition Home or Self Care   Discharge diagnosis Acute congestive heart failure   Does the patient have one of the following disease processes/diagnoses(primary or secondary)? CHF   Prep survey completed? Yes            Gena RESTREPO - Registered Nurse

## 2024-12-16 ENCOUNTER — TELEPHONE (OUTPATIENT)
Age: 69
End: 2024-12-16

## 2024-12-16 NOTE — TELEPHONE ENCOUNTER
Ez Mitchell,    Mrs. Arauz dropped off a DMV form that she stated needs completed prior to her appointment on 12/24. She did not complete the top portion but stated if you need her just give her a call.

## 2024-12-24 ENCOUNTER — OFFICE VISIT (OUTPATIENT)
Age: 69
End: 2024-12-24
Payer: MEDICARE

## 2024-12-24 VITALS
BODY MASS INDEX: 22.25 KG/M2 | TEMPERATURE: 97.7 F | RESPIRATION RATE: 17 BRPM | OXYGEN SATURATION: 95 % | HEART RATE: 98 BPM | DIASTOLIC BLOOD PRESSURE: 85 MMHG | HEIGHT: 70 IN | SYSTOLIC BLOOD PRESSURE: 127 MMHG | WEIGHT: 155.4 LBS

## 2024-12-24 DIAGNOSIS — I10 ESSENTIAL HYPERTENSION: Primary | ICD-10-CM

## 2024-12-24 DIAGNOSIS — E55.9 VITAMIN D DEFICIENCY, UNSPECIFIED: ICD-10-CM

## 2024-12-24 DIAGNOSIS — E78.41 ELEVATED LIPOPROTEIN(A): ICD-10-CM

## 2024-12-24 DIAGNOSIS — E03.9 ACQUIRED HYPOTHYROIDISM: ICD-10-CM

## 2024-12-24 DIAGNOSIS — I50.32 CHRONIC DIASTOLIC (CONGESTIVE) HEART FAILURE (HCC): ICD-10-CM

## 2024-12-24 DIAGNOSIS — F10.982: ICD-10-CM

## 2024-12-24 DIAGNOSIS — Z12.11 COLON CANCER SCREENING: ICD-10-CM

## 2024-12-24 PROCEDURE — 99214 OFFICE O/P EST MOD 30 MIN: CPT | Performed by: FAMILY MEDICINE

## 2024-12-24 PROCEDURE — 1126F AMNT PAIN NOTED NONE PRSNT: CPT | Performed by: FAMILY MEDICINE

## 2024-12-24 PROCEDURE — 3074F SYST BP LT 130 MM HG: CPT | Performed by: FAMILY MEDICINE

## 2024-12-24 PROCEDURE — 1159F MED LIST DOCD IN RCRD: CPT | Performed by: FAMILY MEDICINE

## 2024-12-24 PROCEDURE — 3079F DIAST BP 80-89 MM HG: CPT | Performed by: FAMILY MEDICINE

## 2024-12-24 PROCEDURE — 1123F ACP DISCUSS/DSCN MKR DOCD: CPT | Performed by: FAMILY MEDICINE

## 2024-12-24 ASSESSMENT — ENCOUNTER SYMPTOMS: ABDOMINAL PAIN: 0

## 2024-12-24 NOTE — PROGRESS NOTES
History of Present Illness:     Chief Complaint   Patient presents with    1 Month Follow-Up     - would like new labs for comparison  - would like to stop smoking       Arti Arauz is a 69 y.o. female     Wants her thyroid rechecked  Feels better with the med adjustment    Stopped taking her cholesterol medicine  Felt bad on the med    PMH (REVIEWED):  Past Medical History:   Diagnosis Date    Anxiety     Arthritis     Depression     Dizziness     Falls     HTN (hypertension) 6/25/2010    Hypothyroid 6/25/2010    Thyroid disease     Wrist fracture, left        Current Medications/Allergies (REVIEWED):     Current Outpatient Medications on File Prior to Visit   Medication Sig Dispense Refill    lisinopril (PRINIVIL;ZESTRIL) 10 MG tablet TAKE 1 TABLET BY MOUTH DAILY 90 tablet 3    melatonin 10 MG CAPS capsule Take 1 capsule by mouth nightly 30 capsule 3    vitamin D (ERGOCALCIFEROL) 1.25 MG (05491 UT) CAPS capsule Take 1 capsule by mouth once a week 12 capsule 1    levothyroxine (SYNTHROID) 100 MCG tablet Take 1 tablet by mouth daily 90 tablet 1    MULTIPLE VITAMIN PO Take 1 tablet by mouth daily      Blood Pressure Monitor KIT 1 applicator by Other route daily      aspirin 81 MG EC tablet Take 1 tablet by mouth daily       No current facility-administered medications on file prior to visit.       Allergies   Allergen Reactions    Penicillins Other (See Comments)     Yeast infection  Yeast infection         Review of Systems:     Review of Systems   Cardiovascular:  Negative for chest pain and leg swelling.   Gastrointestinal:  Negative for abdominal pain.   Psychiatric/Behavioral:  Positive for agitation and dysphoric mood.         Objective:     Vitals:    12/24/24 1013   BP: 127/85   Site: Right Upper Arm   Position: Sitting   Cuff Size: Large Adult   Pulse: 98   Resp: 17   Temp: 97.7 °F (36.5 °C)   TempSrc: Oral   SpO2: 95%   Weight: 70.5 kg (155 lb 6.4 oz)   Height: 1.778 m (5' 10\")       Physical

## 2024-12-24 NOTE — PROGRESS NOTES
Arti Arauz is a 69 y.o. female      Chief Complaint   Patient presents with    1 Month Follow-Up     - would like new labs for comparison  - would like to stop smoking       \"Have you been to the ER, urgent care clinic since your last visit?  Hospitalized since your last visit?\"    NO    “Have you seen or consulted any other health care providers outside of [facility] since your last visit?”    NO    “Have you had a colorectal cancer screening such as a colonoscopy/FIT/Cologuard?    NO    No colonoscopy on file  No cologuard on file  Date of last FIT: 12/12/2022   No flexible sigmoidoscopy on file        Have you had a mammogram?”   NO    Date of last Mammogram: 3/8/2023            Vitals:    12/24/24 1013   BP: 127/85   Site: Right Upper Arm   Position: Sitting   Cuff Size: Large Adult   Pulse: 98   Resp: 17   Temp: 97.7 °F (36.5 °C)   TempSrc: Oral   SpO2: 95%   Weight: 70.5 kg (155 lb 6.4 oz)   Height: 1.778 m (5' 10\")            Health Maintenance Due   Topic Date Due    Pneumococcal 65+ years Vaccine (1 of 2 - PCV) Never done    DTaP/Tdap/Td vaccine (1 - Tdap) Never done    Shingles vaccine (1 of 2) Never done    Lung Cancer Screening &/or Counseling  Never done    Colorectal Cancer Screen  12/12/2023    Breast cancer screen  03/08/2024    Flu vaccine (1) Never done    COVID-19 Vaccine (1 - 2023-24 season) Never done    Lipids  10/26/2024         Medication Reconciliation completed, changes noted.  Please  Update medication list.

## 2024-12-25 LAB
25(OH)D3+25(OH)D2 SERPL-MCNC: 61.1 NG/ML (ref 30–100)
BUN SERPL-MCNC: 15 MG/DL (ref 8–27)
BUN/CREAT SERPL: 16 (ref 12–28)
CALCIUM SERPL-MCNC: 9.7 MG/DL (ref 8.7–10.3)
CHLORIDE SERPL-SCNC: 96 MMOL/L (ref 96–106)
CHOLEST SERPL-MCNC: 240 MG/DL (ref 100–199)
CO2 SERPL-SCNC: 25 MMOL/L (ref 20–29)
CREAT SERPL-MCNC: 0.94 MG/DL (ref 0.57–1)
EGFRCR SERPLBLD CKD-EPI 2021: 65 ML/MIN/1.73
GLUCOSE SERPL-MCNC: 123 MG/DL (ref 70–99)
HDLC SERPL-MCNC: 103 MG/DL
IMP & REVIEW OF LAB RESULTS: NORMAL
LDLC SERPL CALC-MCNC: 122 MG/DL (ref 0–99)
POTASSIUM SERPL-SCNC: 3.8 MMOL/L (ref 3.5–5.2)
SODIUM SERPL-SCNC: 137 MMOL/L (ref 134–144)
TRIGL SERPL-MCNC: 88 MG/DL (ref 0–149)
TSH SERPL DL<=0.005 MIU/L-ACNC: 2.09 UIU/ML (ref 0.45–4.5)
VLDLC SERPL CALC-MCNC: 15 MG/DL (ref 5–40)

## 2024-12-26 ENCOUNTER — TELEPHONE (OUTPATIENT)
Age: 69
End: 2024-12-26

## 2024-12-26 NOTE — TELEPHONE ENCOUNTER
----- Message from Dr. Kathy Mitchell MD sent at 12/26/2024  8:41 AM EST -----  Elevated LDL but stable  Would benefit from a statin  Remaining labs good - renal fx, thyroid, vitamin D    Please call and notify pt of results. Her thyroid, kidney function and electrolytes are good. Her vitamin D level is normal. However, her cholesterol is elevated. I would recommend we start a statin medication to lower her cholesterol.

## 2024-12-26 NOTE — TELEPHONE ENCOUNTER
I spoke with patient and relayed results from the provider. She verbally consented to understanding the results but she had questions about the statin.     Pt reported that she does not do well on statins - he causing her to be extremely winded and she gets SOB quickly.     Please advise.

## 2024-12-28 DIAGNOSIS — E55.9 VITAMIN D DEFICIENCY: ICD-10-CM

## 2024-12-30 RX ORDER — ERGOCALCIFEROL 1.25 MG/1
50000 CAPSULE, LIQUID FILLED ORAL WEEKLY
Qty: 12 CAPSULE | Refills: 1 | Status: SHIPPED | OUTPATIENT
Start: 2024-12-30

## 2024-12-30 NOTE — TELEPHONE ENCOUNTER
Medication Refill Request    Arti Arauz is requesting a refill of the following medication(s):   Requested Prescriptions     Pending Prescriptions Disp Refills    vitamin D (ERGOCALCIFEROL) 1.25 MG (13518 UT) CAPS capsule [Pharmacy Med Name: VITAMIN D2 50,000IU (ERGO) CAP RX] 12 capsule 1     Sig: TAKE 1 CAPSULE BY MOUTH 1 TIME A WEEK        Listed PCP is Kathy Mitchell MD   Last provider to prescribe medication: Kathy Mitchell MD  Last Date of Medication Prescribed: 08.07.24   Date of Last Office Visit at [facility]: 12.24.24   FUTURE APPOINTMENT: No future appointments.    Please send refill to:    [id number] [place], [place] - [address] - P [phone] - F [phone] [address]  [facility] [address]  Phone: [phone] Fax: [phone]      Please review request and approve or deny with recommendations.

## 2025-01-29 LAB — NONINV COLON CA DNA+OCC BLD SCRN STL QL: POSITIVE

## 2025-01-30 ENCOUNTER — TELEPHONE (OUTPATIENT)
Age: 70
End: 2025-01-30

## 2025-01-30 DIAGNOSIS — R19.5 POSITIVE COLORECTAL CANCER SCREENING USING COLOGUARD TEST: Primary | ICD-10-CM

## 2025-01-30 DIAGNOSIS — R26.9 GAIT DIFFICULTY: ICD-10-CM

## 2025-01-30 DIAGNOSIS — I73.9 PVD (PERIPHERAL VASCULAR DISEASE) (HCC): ICD-10-CM

## 2025-01-30 DIAGNOSIS — R29.6 FALLS: ICD-10-CM

## 2025-01-30 DIAGNOSIS — Z53.20 COLONOSCOPY REFUSED: ICD-10-CM

## 2025-01-30 NOTE — TELEPHONE ENCOUNTER
Called pt to discuss positive Cologuard test. ID confirmed x 2.    Advised patient to get colonoscopy given positive cologuard test. Pt declines at this point because \"I don't want foreign instruments in me\".  I explained that without the colonoscopy we cannot rule out polyps, cancer or bleeding. Pt still declines and understands the risk. She will look out for any bleeding, change in stools, unexplained weight loss.     Will check CBC at next visit. Consider repeat of cologuard or FOBT at next visit.     Kathy Mitchell MD

## 2025-01-31 DIAGNOSIS — E03.4 HYPOTHYROIDISM DUE TO ACQUIRED ATROPHY OF THYROID: ICD-10-CM

## 2025-02-04 RX ORDER — LEVOTHYROXINE SODIUM 100 MCG
100 TABLET ORAL DAILY
Qty: 90 TABLET | Refills: 1 | Status: SHIPPED | OUTPATIENT
Start: 2025-02-04

## 2025-02-04 NOTE — TELEPHONE ENCOUNTER
Medication Refill Request    Arti Arauz is requesting a refill of the following medication(s):   Requested Prescriptions     Pending Prescriptions Disp Refills    SYNTHROID 100 MCG tablet [Pharmacy Med Name: SYNTHROID 0.1MG (100MCG)TABLETS] 90 tablet 1     Sig: TAKE 1 TABLET BY MOUTH DAILY     Lab Results   Component Value Date    TSH 2.090 12/24/2024        Listed PCP is Kathy Mitchell MD   Last provider to prescribe medication: Dr. Mitchell on 08/07/2024  Date of Last Office Visit at [facility]: 12/24/2024 with Dr. Mitchell    FUTURE [facility] APPOINTMENT: Visit date not found    Please send refill to:    [id number] [address] - P [phone] - F [phone] [address]  [facility] [address]  Phone: [phone] Fax: [phone]      Please review request and approve or deny with recommendations within 48 hours.

## 2025-02-13 ENCOUNTER — TELEPHONE (OUTPATIENT)
Age: 70
End: 2025-02-13

## 2025-02-13 NOTE — TELEPHONE ENCOUNTER
Yue MCCORMICK with [employer] called re: pt needing a  PA for Vitamin D and Exception for Synthroid. Phone call was transferred to Urszula.

## 2025-02-13 NOTE — TELEPHONE ENCOUNTER
I spoke w/ Flor Q from [facility] and she needed additional information regarding two of the patient's medication.     She needed clarification on patient's Synthroid and Vitamin D. She needed the ICD 10 code for Synthroid - and she needed PA information for her vitamin D.

## 2025-02-18 ENCOUNTER — CLINICAL DOCUMENTATION (OUTPATIENT)
Age: 70
End: 2025-02-18

## 2025-05-05 ENCOUNTER — TELEPHONE (OUTPATIENT)
Age: 70
End: 2025-05-05

## 2025-05-05 NOTE — TELEPHONE ENCOUNTER
I spoke w/ patient and she stated she is doing ok just going through some mental issues at the current time. She stated that she is not getting any reminders or text messages regarding her appointments anymore and that is how she missed her appointment.     Your schedule is pretty full but she is requesting an appointment at the end of May - do you have a time and date that would be good to bring her in for a follow up visit?

## 2025-05-06 NOTE — TELEPHONE ENCOUNTER
I spoke w/ patient and she stated she prefer a morning appt due to her not wanting to drive in the afternoon.     She stated she is currently in public and unable to speak how she would like but she knows she is unable to do an afternoon appointment.     She also wants to get scheduled for her mammogram as well at the same location.

## 2025-08-26 DIAGNOSIS — E03.4 HYPOTHYROIDISM DUE TO ACQUIRED ATROPHY OF THYROID: ICD-10-CM

## 2025-08-26 RX ORDER — LEVOTHYROXINE SODIUM 100 MCG
100 TABLET ORAL DAILY
Qty: 90 TABLET | Refills: 1 | Status: SHIPPED | OUTPATIENT
Start: 2025-08-26